# Patient Record
Sex: FEMALE | Race: WHITE | Employment: UNEMPLOYED | URBAN - METROPOLITAN AREA
[De-identification: names, ages, dates, MRNs, and addresses within clinical notes are randomized per-mention and may not be internally consistent; named-entity substitution may affect disease eponyms.]

---

## 2017-11-08 LAB — PAP-ABSTRACT: NORMAL

## 2018-03-13 ENCOUNTER — TRANSFERRED RECORDS (OUTPATIENT)
Dept: HEALTH INFORMATION MANAGEMENT | Facility: CLINIC | Age: 53
End: 2018-03-13

## 2019-03-01 ENCOUNTER — TRANSFERRED RECORDS (OUTPATIENT)
Dept: HEALTH INFORMATION MANAGEMENT | Facility: CLINIC | Age: 54
End: 2019-03-01

## 2019-03-01 LAB
ALT SERPL-CCNC: 37 IU/L (ref 8–45)
AST SERPL-CCNC: 30 IU/L (ref 2–40)
CHOLEST SERPL-MCNC: 203 MG/DL (ref 100–199)
CREAT SERPL-MCNC: 0.88 MG/DL (ref 0.57–1.11)
GFR SERPL CREATININE-BSD FRML MDRD: >60 ML/MIN/1.73M2
GLUCOSE SERPL-MCNC: 111 MG/DL (ref 65–100)
HDLC SERPL-MCNC: 52 MG/DL
LDLC SERPL CALC-MCNC: 104 MG/DL
NONHDLC SERPL-MCNC: 151 MG/DL
POTASSIUM SERPL-SCNC: 4 MMOL/L (ref 3.5–5)
TRIGL SERPL-MCNC: 233 MG/DL

## 2019-03-08 ENCOUNTER — OFFICE VISIT (OUTPATIENT)
Dept: FAMILY MEDICINE | Facility: CLINIC | Age: 54
End: 2019-03-08
Payer: COMMERCIAL

## 2019-03-08 VITALS
TEMPERATURE: 98 F | HEIGHT: 63 IN | BODY MASS INDEX: 36.94 KG/M2 | RESPIRATION RATE: 17 BRPM | HEART RATE: 86 BPM | DIASTOLIC BLOOD PRESSURE: 100 MMHG | OXYGEN SATURATION: 94 % | WEIGHT: 208.5 LBS | SYSTOLIC BLOOD PRESSURE: 132 MMHG

## 2019-03-08 DIAGNOSIS — Z12.31 ENCOUNTER FOR SCREENING MAMMOGRAM FOR BREAST CANCER: ICD-10-CM

## 2019-03-08 DIAGNOSIS — Z96.642 STATUS POST TOTAL REPLACEMENT OF LEFT HIP: ICD-10-CM

## 2019-03-08 DIAGNOSIS — Z72.0 TOBACCO USE: ICD-10-CM

## 2019-03-08 DIAGNOSIS — I10 ESSENTIAL HYPERTENSION: Primary | ICD-10-CM

## 2019-03-08 DIAGNOSIS — R09.81 NASAL CONGESTION: ICD-10-CM

## 2019-03-08 DIAGNOSIS — R73.9 ELEVATED BLOOD SUGAR: ICD-10-CM

## 2019-03-08 LAB — HBA1C MFR BLD: 5.8 % (ref 0–5.6)

## 2019-03-08 PROCEDURE — 36415 COLL VENOUS BLD VENIPUNCTURE: CPT | Performed by: PHYSICIAN ASSISTANT

## 2019-03-08 PROCEDURE — 99203 OFFICE O/P NEW LOW 30 MIN: CPT | Performed by: PHYSICIAN ASSISTANT

## 2019-03-08 PROCEDURE — 83036 HEMOGLOBIN GLYCOSYLATED A1C: CPT | Performed by: PHYSICIAN ASSISTANT

## 2019-03-08 RX ORDER — CHLORTHALIDONE 25 MG/1
25 TABLET ORAL DAILY
Qty: 90 TABLET | Refills: 0 | Status: SHIPPED | OUTPATIENT
Start: 2019-03-08 | End: 2019-06-11

## 2019-03-08 RX ORDER — FLUTICASONE PROPIONATE 50 MCG
2 SPRAY, SUSPENSION (ML) NASAL DAILY
Qty: 16 G | Refills: 0 | Status: SHIPPED | OUTPATIENT
Start: 2019-03-08 | End: 2019-06-20

## 2019-03-08 ASSESSMENT — MIFFLIN-ST. JEOR: SCORE: 1504.88

## 2019-03-08 NOTE — NURSING NOTE
Last mammogram done at [facility] in 3/2018. Hysterectomy in March of 2018, last pap done 9/2017 at this time at [facility] in [place]. Patient had colonoscopy done 7/2013 at [facility] in [place].

## 2019-03-08 NOTE — PATIENT INSTRUCTIONS
To schedule your mammogram at any of the [facility] locations, call [phone].    [facility] Consumer Price Line: [phone]    Try taking the losartan and chlorthalidone in the morning! These are your BP meds    Check the blood pressure next week at our pharmacy!

## 2019-03-11 PROBLEM — I10 HTN (HYPERTENSION): Status: ACTIVE | Noted: 2019-03-11

## 2019-03-19 ENCOUNTER — ALLIED HEALTH/NURSE VISIT (OUTPATIENT)
Dept: FAMILY MEDICINE | Facility: CLINIC | Age: 54
End: 2019-03-19
Payer: COMMERCIAL

## 2019-03-19 VITALS — SYSTOLIC BLOOD PRESSURE: 102 MMHG | DIASTOLIC BLOOD PRESSURE: 74 MMHG

## 2019-03-19 DIAGNOSIS — K21.00 GASTROESOPHAGEAL REFLUX DISEASE WITH ESOPHAGITIS: Primary | ICD-10-CM

## 2019-03-19 DIAGNOSIS — N95.1 MENOPAUSAL SYNDROME (HOT FLASHES): ICD-10-CM

## 2019-03-19 DIAGNOSIS — I10 HTN (HYPERTENSION): Primary | ICD-10-CM

## 2019-03-19 PROCEDURE — 99207 ZZC NO CHARGE NURSE ONLY: CPT | Performed by: PHYSICIAN ASSISTANT

## 2019-03-19 NOTE — PROGRESS NOTES
Phuong New is enrolled/participating in the retail pharmacy Blood Pressure Goals Achievement Program (BPGAP).  Phuong New was evaluated at [facility] on March 19, 2019 at which time her blood pressure was:    BP Readings from Last 3 Encounters:   03/19/19 102/74   03/08/19 (!) 132/100   08/12/16 117/73     Reviewed lifestyle modifications for blood pressure control and reduction: including making healthy food choices, managing weight, getting regular exercise, smoking cessation, reducing alcohol consumption, monitoring blood pressure regularly.     Phuong New is not experiencing symptoms.    Follow-Up: BP is at goal of < 140/90mmHg (patient 18+ years of age with or without diabetes).  Recommended follow-up at pharmacy in 6 months.     Recommendation to Provider: continue to monitor.  Patient was not experiencing any dizziness or light headedness.  Took blood pressure twice got same reading       This note completed by: Mony Valenzuela, PharmD  [facility]

## 2019-03-20 NOTE — TELEPHONE ENCOUNTER
LMTCB-    Patient asked to have Pantoprazole(protonix) reordered do not see that medication on list.     Is she wanting Omeprazole (prilosec) reordered? This is on list.     Livia Osborn RN Flex

## 2019-03-20 NOTE — TELEPHONE ENCOUNTER
It was Omeprazole (prilosec) that patient wants reordered.     LMTCB- need why she was taking the prilosec and gabapentin.     Livia Osborn RN Flex

## 2019-03-22 RX ORDER — GABAPENTIN 600 MG/1
600 TABLET ORAL DAILY
Qty: 90 TABLET | Refills: 0 | Status: SHIPPED | OUTPATIENT
Start: 2019-03-22 | End: 2019-06-28

## 2019-03-22 NOTE — TELEPHONE ENCOUNTER
Routing to Giovani to advise on refills for Omeprazole 20 mg every day prn for GERD & Gabapentin 600 mg daily for hot flashes    [place] Gabap[place] last filled: 1/2/19 #90, 9/19/18 #90

## 2019-03-22 NOTE — TELEPHONE ENCOUNTER
Patient called back she takes prilosec for acid reflux and the gabaoentin for hot flashes   Patient stated that the   Gabapentin is 600 mg incase pcp needs to know

## 2019-04-13 ENCOUNTER — ANCILLARY PROCEDURE (OUTPATIENT)
Dept: MAMMOGRAPHY | Facility: CLINIC | Age: 54
End: 2019-04-13
Attending: PHYSICIAN ASSISTANT
Payer: COMMERCIAL

## 2019-04-13 DIAGNOSIS — Z12.31 ENCOUNTER FOR SCREENING MAMMOGRAM FOR BREAST CANCER: ICD-10-CM

## 2019-04-13 PROCEDURE — 77067 SCR MAMMO BI INCL CAD: CPT

## 2019-04-13 PROCEDURE — 77063 BREAST TOMOSYNTHESIS BI: CPT

## 2019-06-07 DIAGNOSIS — I10 ESSENTIAL HYPERTENSION: Primary | ICD-10-CM

## 2019-06-07 DIAGNOSIS — K21.00 GASTROESOPHAGEAL REFLUX DISEASE WITH ESOPHAGITIS: ICD-10-CM

## 2019-06-07 NOTE — TELEPHONE ENCOUNTER
"Requested Prescriptions   Pending Prescriptions Disp Refills     omeprazole (PRILOSEC) 20 MG DR capsule [Pharmacy Med Name: OMEPRAZOLE 20MG CPDR]  Last Written Prescription Date:  3/22/19  Last Fill Quantity: 90,  # refills: 0    Last office visit: 3/8/2019 with prescribing provider:  Giovani Vang PA-C        Future Office Visit:     90 capsule 0     Sig: TAKE ONE CAPSULE BY MOUTH ONCE DAILY       PPI Protocol Passed - 6/7/2019  2:25 PM        Passed - Not on Clopidogrel (unless Pantoprazole ordered)        Passed - No diagnosis of osteoporosis on record        Passed - Recent (12 mo) or future (30 days) visit within the authorizing provider's specialty     Patient had office visit in the last 12 months or has a visit in the next 30 days with authorizing provider or within the authorizing provider's specialty.  See \"Patient Info\" tab in inbasket, or \"Choose Columns\" in Meds & Orders section of the refill encounter.              Passed - Medication is active on med list        Passed - Patient is age 18 or older        Passed - No active pregnacy on record        Passed - No positive pregnancy test in past 12 months          "

## 2019-06-07 NOTE — TELEPHONE ENCOUNTER
"Requested Prescriptions   Pending Prescriptions Disp Refills     chlorthalidone (HYGROTON) 25 MG tablet  Last Written Prescription Date:  3/8/19  Last Fill Quantity: 90,  # refills: 0    Last office visit: 3/8/2019 with prescribing provider:  Giovani Vang PA-C       Future Office Visit:     90 tablet 0     Sig: Take 1 tablet (25 mg) by mouth daily       Diuretics (Including Combos) Protocol Failed - 6/7/2019  2:31 PM        Failed - Normal serum creatinine on file in past 12 months     Recent Labs   Lab Test 08/10/16  0540   CR 0.61              Failed - Normal serum potassium on file in past 12 months     No lab results found.                 Failed - Normal serum sodium on file in past 12 months     No lab results found.           Passed - Blood pressure under 140/90 in past 12 months     BP Readings from Last 3 Encounters:   03/19/19 102/74   03/08/19 (!) 132/100   08/12/16 117/73                 Passed - Recent (12 mo) or future (30 days) visit within the authorizing provider's specialty     Patient had office visit in the last 12 months or has a visit in the next 30 days with authorizing provider or within the authorizing provider's specialty.  See \"Patient Info\" tab in inbasket, or \"Choose Columns\" in Meds & Orders section of the refill encounter.              Passed - Medication is active on med list        Passed - Patient is age 18 or older        Passed - No active pregancy on record        Passed - No positive pregnancy test in past 12 months        losartan (COZAAR) 100 MG tablet  Last Written Prescription Date:  historical  Last Fill Quantity: na,  # refills: na    Last office visit: 3/8/2019 with prescribing provider:  Giovani Vang PA-C       Future Office Visit:           Sig: Take 1 tablet (100 mg) by mouth daily       Angiotensin-II Receptors Failed - 6/7/2019  2:31 PM        Failed - Normal serum creatinine on file in past 12 months     Recent Labs   Lab Test 08/10/16  0540   CR " "0.61             Failed - Normal serum potassium on file in past 12 months     No lab results found.                 Passed - Blood pressure under 140/90 in past 12 months     BP Readings from Last 3 Encounters:   03/19/19 102/74   03/08/19 (!) 132/100   08/12/16 117/73                 Passed - Recent (12 mo) or future (30 days) visit within the authorizing provider's specialty     Patient had office visit in the last 12 months or has a visit in the next 30 days with authorizing provider or within the authorizing provider's specialty.  See \"Patient Info\" tab in inbasket, or \"Choose Columns\" in Meds & Orders section of the refill encounter.              Passed - Medication is active on med list        Passed - Patient is age 18 or older        Passed - No active pregnancy on record        Passed - No positive pregnancy test in past 12 months          "

## 2019-06-11 RX ORDER — CHLORTHALIDONE 25 MG/1
25 TABLET ORAL DAILY
Qty: 30 TABLET | Refills: 0 | Status: SHIPPED | OUTPATIENT
Start: 2019-06-11 | End: 2019-06-20

## 2019-06-11 RX ORDER — LOSARTAN POTASSIUM 100 MG/1
100 TABLET ORAL DAILY
Qty: 30 TABLET | Refills: 0 | Status: SHIPPED | OUTPATIENT
Start: 2019-06-11 | End: 2019-06-20

## 2019-06-11 NOTE — TELEPHONE ENCOUNTER
Medication is being filled for 1 time refill only due to:  Patient needs to be seen because due for a med check.     Will forward to the station, please try and help the pt schedule an appt for a med check.  Thanks!

## 2019-06-11 NOTE — TELEPHONE ENCOUNTER
Medication is being filled for 1 time refill only due to:  Pt due for appt. Scheduled 6/20/19- will be out of meds prior to appt.     Anusha w/ DM- ok to refill for 30 days even if not prescribed mentioned in last note and is scheduled to see him.     Soheila KATHLEEN RN

## 2019-06-11 NOTE — TELEPHONE ENCOUNTER
Pt called back and I scheduled her for 6/20 with PCP.  I told her the [facility] was approved for one fill and she said there were two other medications she had asked for and wanted to say she would be out of them before the appt on 6/20.  I only saw a reorder for chlorthalidone (HYGROTON)  And LOSARTAN POTASSIUM PO  With a request date from 6/7 but it looks like they are still pending.      Please call pt back to confirm if her other refills will be approved for  before scheduled appt    [phone]

## 2019-06-11 NOTE — TELEPHONE ENCOUNTER
ALEKS- she needs to be scheduled for appt tom/ Estiven Vang for med check- she has pending RX refills.     Soheila KATHLEEN RN

## 2019-06-18 NOTE — PROGRESS NOTES
Subjective     Phuong New is a 54 year old female who presents to clinic today for the following health issues:    HPI   Hypertension Follow-up      Do you check your blood pressure regularly outside of the clinic? No     Are you following a low salt diet? No    Are your blood pressures ever more than 140 on the top number (systolic) OR more   than 90 on the bottom number (diastolic), for example 140/90? Unsure     Amount of exercise or physical activity: None    Problems taking medications regularly: No    Medication side effects: none    Diet: regular (no restrictions)    -Phuong presents for a BP check  -at her last visit she was uncontrolled but had not been on her meds that day  -she tolerates them well   -first on losartan, then chlorthalidone  -nocturia has now improved by moving the diuretic to the morning  -does note lower energy the past few weeks  -has additionally noted some left sided back pain, can wrap around to front  -did move just prior to the onset of back symptoms   -unsure about hematuria  -BMs have been off; slightly different  -O2 slightly on the low end; normal; denies any shortness of breath         Medication Followup of Omeprazole     Taking Medication as prescribed: yes    Side Effects:  None    Medication Helping Symptoms:  Yes     Taking PPI daily for acid reflux  Controlled      Patient Active Problem List   Diagnosis     S/P total hip arthroplasty     HTN (hypertension)     Past Surgical History:   Procedure Laterality Date     ARTHROPLASTY HIP Left 8/9/2016    Procedure: ARTHROPLASTY HIP;  Surgeon: Janes Montoya MD;  Location: [facility] OR     HYSTERECTOMY, PAP NO LONGER INDICATED      robotic total laparoscopic hysterectomy, bilateral salpingectomy, right oophorectomy, cystoscopy 03/19/2018      TUBAL LIGATION         Social History     Tobacco Use     Smoking status: Current Every Day Smoker     Packs/day: 1.00     Years: 40.00     Pack years: 40.00     Types: Cigarettes      "Smokeless tobacco: Never Used   Substance Use Topics     Alcohol use: Yes     Comment: 2 beers daily 3-4 times weekly     History reviewed. No pertinent family history.      Reviewed and updated as needed this visit by Provider         Review of Systems   See Above      Objective    /74   Pulse 79   Temp 97.9  F (36.6  C) (Tympanic)   Resp 18   Ht 1.6 m (5' 3\")   Wt 93.7 kg (206 lb 9.6 oz)   SpO2 92%   BMI 36.60 kg/m    Body mass index is 36.6 kg/m .  Physical Exam   GENERAL: healthy, alert and no distress  EYES: Eyes grossly normal to inspection, PERRL and conjunctivae and sclerae normal  HENT: ear canals and TM's normal, nose and mouth without ulcers or lesions  NECK: no jvd  RESP: wheezing to LLL; no rales  CV: regular rates and rhythm  MS: No peripheral edema   SKIN: no suspicious lesions or rashes  PSYCH: mentation appears normal, affect normal/bright    Diagnostic Test Results:  Results for orders placed or performed in visit on 06/20/19 (from the past 24 hour(s))   CBC with platelets and differential   Result Value Ref [facility]    WBC 6.8 4.0 - 11.0 10e9/L    RBC Count 5.31 (H) 3.8 - 5.2 10e12/L    Hemoglobin 17.1 (H) 11.7 - 15.7 g/dL    Hematocrit 50.0 (H) 35.0 - 47.0 %    MCV 94 78 - 100 fl    MCH 32.2 26.5 - 33.0 pg    MCHC 34.2 31.5 - 36.5 g/dL    RDW 13.3 10.0 - 15.0 %    Platelet Count 224 150 - 450 10e9/L    % Neutrophils 59.1 %    % Lymphocytes 28.6 %    % Monocytes 9.2 %    % Eosinophils 2.7 %    % Basophils 0.4 %    Absolute Neutrophil 4.0 1.6 - 8.3 10e9/L    Absolute Lymphocytes 1.9 0.8 - 5.3 10e9/L    Absolute Monocytes 0.6 0.0 - 1.3 10e9/L    Absolute Eosinophils 0.2 0.0 - 0.7 10e9/L    Absolute Basophils 0.0 0.0 - 0.2 10e9/L    Diff Method Automated Method    *UA reflex to Microscopic and Culture ([facility] (except [place] and [place])   Result Value Ref [facility]    Color Urine Yellow     Appearance Urine Clear     Glucose Urine Negative NEG^Negative mg/dL    " Bilirubin Urine Negative NEG^Negative    Ketones Urine Negative NEG^Negative mg/dL    Specific Gravity Urine 1.020 1.003 - 1.035    Blood Urine Negative NEG^Negative    pH Urine 7.0 5.0 - 7.0 pH    Protein Albumin Urine Negative NEG^Negative mg/dL    Urobilinogen Urine 0.2 0.2 - 1.0 EU/dL    Nitrite Urine Negative NEG^Negative    Leukocyte Esterase Urine Negative NEG^Negative    Source Midstream Urine      CXR - unremarkable        Assessment & Plan     1. Essential hypertension  This is well controlled. To consider halving the diuretic dose to 12.5mg daily. Continue present management for now. Continue to work at lifestyle changes  - chlorthalidone (HYGROTON) 25 MG tablet; Take 1 tablet (25 mg) by mouth daily  Dispense: 90 tablet; Refill: 1  - losartan (COZAAR) 100 MG tablet; Take 1 tablet (100 mg) by mouth daily  Dispense: 90 tablet; Refill: 1    2. Other fatigue  3. Elevated hemoglobin (H)  4. Tobacco use  5. Wheeze  Unclear if these symptoms are intertwined or separate. Her sats were initially low (and trend that way) but did come back to 95 in clinic. Xray appeared without acute infectious concern. Wheeze on exam and chronic tobacco use elicit concern for copd though she notes she was tested a year or two ago on spirometry and showed no such dx. Still, short course of steroid to help wheeze; suspect symptoms will improve. The elevated hgb and hematocrit, I suspect is likely due to chronic smoking/lung disease but will run a smear to r/o PV. Follow-up per symptoms and/or results  - XR Chest 2 Views; Future  - CBC with platelets and differential  - *UA reflex to Microscopic and Culture ([facility] (except [place] and [place])  - Blood Morphology Pathologist Review  - Reticulocyte Count  - predniSONE (DELTASONE) 20 MG tablet; Take 2 tablets (40 mg) by mouth daily  Dispense: 10 tablet; Refill: 0    6. Gastroesophageal reflux disease with esophagitis  Continue present management.     7. Morbid  "obesity (H)  Continue to work at goals    8. Flank pain  Likely muscular from move  - CBC with platelets and differential  - *UA reflex to Microscopic and Culture ([facility] (except [place] and [place])    9. Nasal congestion  Continue present management.   - fluticasone (FLONASE) 50 MCG/ACT nasal spray; Spray 2 sprays into both nostrils daily  Dispense: 16 g; Refill: 0     Tobacco Cessation:   reports that she has been smoking cigarettes.  She has a 40.00 pack-year smoking history. She has never used smokeless tobacco.  Tobacco Cessation Action Plan: Information offered: Patient not interested at this time      BMI:   Estimated body mass index is 36.6 kg/m  as calculated from the following:    Height as of this encounter: 1.6 m (5' 3\").    Weight as of this encounter: 93.7 kg (206 lb 9.6 oz).   Weight management plan: Discussed healthy diet and exercise guidelines      Return in about 6 months (around 12/20/2019) for BP Recheck.    Giovani Vang PA-C  [facility]      "

## 2019-06-20 ENCOUNTER — OFFICE VISIT (OUTPATIENT)
Dept: FAMILY MEDICINE | Facility: CLINIC | Age: 54
End: 2019-06-20
Payer: COMMERCIAL

## 2019-06-20 ENCOUNTER — ANCILLARY PROCEDURE (OUTPATIENT)
Dept: GENERAL RADIOLOGY | Facility: CLINIC | Age: 54
End: 2019-06-20
Attending: PHYSICIAN ASSISTANT
Payer: COMMERCIAL

## 2019-06-20 VITALS
HEIGHT: 63 IN | DIASTOLIC BLOOD PRESSURE: 74 MMHG | HEART RATE: 79 BPM | RESPIRATION RATE: 18 BRPM | TEMPERATURE: 97.9 F | SYSTOLIC BLOOD PRESSURE: 108 MMHG | WEIGHT: 206.6 LBS | OXYGEN SATURATION: 92 % | BODY MASS INDEX: 36.61 KG/M2

## 2019-06-20 DIAGNOSIS — D58.2 ELEVATED HEMOGLOBIN (H): ICD-10-CM

## 2019-06-20 DIAGNOSIS — Z72.0 TOBACCO USE: ICD-10-CM

## 2019-06-20 DIAGNOSIS — K21.00 GASTROESOPHAGEAL REFLUX DISEASE WITH ESOPHAGITIS: ICD-10-CM

## 2019-06-20 DIAGNOSIS — R53.83 OTHER FATIGUE: ICD-10-CM

## 2019-06-20 DIAGNOSIS — R09.81 NASAL CONGESTION: ICD-10-CM

## 2019-06-20 DIAGNOSIS — R10.9 FLANK PAIN: ICD-10-CM

## 2019-06-20 DIAGNOSIS — I10 ESSENTIAL HYPERTENSION: Primary | ICD-10-CM

## 2019-06-20 DIAGNOSIS — R06.2 WHEEZE: ICD-10-CM

## 2019-06-20 DIAGNOSIS — E66.01 MORBID OBESITY (H): ICD-10-CM

## 2019-06-20 LAB
ALBUMIN UR-MCNC: NEGATIVE MG/DL
APPEARANCE UR: CLEAR
BASOPHILS # BLD AUTO: 0 10E9/L (ref 0–0.2)
BASOPHILS NFR BLD AUTO: 0.4 %
BILIRUB UR QL STRIP: NEGATIVE
COLOR UR AUTO: YELLOW
DIFFERENTIAL METHOD BLD: ABNORMAL
EOSINOPHIL # BLD AUTO: 0.2 10E9/L (ref 0–0.7)
EOSINOPHIL NFR BLD AUTO: 2.7 %
ERYTHROCYTE [DISTWIDTH] IN BLOOD BY AUTOMATED COUNT: 13.3 % (ref 10–15)
GLUCOSE UR STRIP-MCNC: NEGATIVE MG/DL
HCT VFR BLD AUTO: 50 % (ref 35–47)
HGB BLD-MCNC: 17.1 G/DL (ref 11.7–15.7)
HGB UR QL STRIP: NEGATIVE
KETONES UR STRIP-MCNC: NEGATIVE MG/DL
LEUKOCYTE ESTERASE UR QL STRIP: NEGATIVE
LYMPHOCYTES # BLD AUTO: 1.9 10E9/L (ref 0.8–5.3)
LYMPHOCYTES NFR BLD AUTO: 28.6 %
MCH RBC QN AUTO: 32.2 PG (ref 26.5–33)
MCHC RBC AUTO-ENTMCNC: 34.2 G/DL (ref 31.5–36.5)
MCV RBC AUTO: 94 FL (ref 78–100)
MONOCYTES # BLD AUTO: 0.6 10E9/L (ref 0–1.3)
MONOCYTES NFR BLD AUTO: 9.2 %
NEUTROPHILS # BLD AUTO: 4 10E9/L (ref 1.6–8.3)
NEUTROPHILS NFR BLD AUTO: 59.1 %
NITRATE UR QL: NEGATIVE
PH UR STRIP: 7 PH (ref 5–7)
PLATELET # BLD AUTO: 224 10E9/L (ref 150–450)
PLATELET # BLD EST: ABNORMAL 10*3/UL
RBC # BLD AUTO: 5.31 10E12/L (ref 3.8–5.2)
RBC MORPH BLD: ABNORMAL
RETICS # AUTO: 112.1 10E9/L (ref 25–95)
RETICS/RBC NFR AUTO: 2.1 % (ref 0.5–2)
SOURCE: NORMAL
SP GR UR STRIP: 1.02 (ref 1–1.03)
UROBILINOGEN UR STRIP-ACNC: 0.2 EU/DL (ref 0.2–1)
WBC # BLD AUTO: 6.8 10E9/L (ref 4–11)

## 2019-06-20 PROCEDURE — 85025 COMPLETE CBC W/AUTO DIFF WBC: CPT | Performed by: PHYSICIAN ASSISTANT

## 2019-06-20 PROCEDURE — 85045 AUTOMATED RETICULOCYTE COUNT: CPT | Performed by: PHYSICIAN ASSISTANT

## 2019-06-20 PROCEDURE — 81003 URINALYSIS AUTO W/O SCOPE: CPT | Performed by: PHYSICIAN ASSISTANT

## 2019-06-20 PROCEDURE — 99214 OFFICE O/P EST MOD 30 MIN: CPT | Performed by: PHYSICIAN ASSISTANT

## 2019-06-20 PROCEDURE — 71046 X-RAY EXAM CHEST 2 VIEWS: CPT | Mod: FY

## 2019-06-20 PROCEDURE — 85060 BLOOD SMEAR INTERPRETATION: CPT | Performed by: PHYSICIAN ASSISTANT

## 2019-06-20 PROCEDURE — 36415 COLL VENOUS BLD VENIPUNCTURE: CPT | Performed by: PHYSICIAN ASSISTANT

## 2019-06-20 RX ORDER — CHLORTHALIDONE 25 MG/1
25 TABLET ORAL DAILY
Qty: 90 TABLET | Refills: 1 | Status: SHIPPED | OUTPATIENT
Start: 2019-06-20 | End: 2019-07-22

## 2019-06-20 RX ORDER — FLUTICASONE PROPIONATE 50 MCG
2 SPRAY, SUSPENSION (ML) NASAL DAILY
Qty: 16 G | Refills: 0 | Status: SHIPPED | OUTPATIENT
Start: 2019-06-20 | End: 2019-07-22

## 2019-06-20 RX ORDER — PREDNISONE 20 MG/1
40 TABLET ORAL DAILY
Qty: 10 TABLET | Refills: 0 | Status: SHIPPED | OUTPATIENT
Start: 2019-06-20 | End: 2019-11-05

## 2019-06-20 RX ORDER — LOSARTAN POTASSIUM 100 MG/1
100 TABLET ORAL DAILY
Qty: 90 TABLET | Refills: 1 | Status: SHIPPED | OUTPATIENT
Start: 2019-06-20 | End: 2019-07-22

## 2019-06-20 ASSESSMENT — MIFFLIN-ST. JEOR: SCORE: 1496.26

## 2019-06-21 LAB — COPATH REPORT: NORMAL

## 2019-06-25 DIAGNOSIS — D58.2 ELEVATED HEMOGLOBIN (H): Primary | ICD-10-CM

## 2019-06-25 DIAGNOSIS — Z72.0 TOBACCO USE: ICD-10-CM

## 2019-06-25 NOTE — PROGRESS NOTES
I placed the order for a one time consult with hematology. Please share with her the phone number to schedule: [facility] [place] [phone]

## 2019-06-26 NOTE — TELEPHONE ENCOUNTER
RECORDS STATUS - ALL OTHER DIAGNOSIS      RECORDS RECEIVED FROM: [facility]   DATE RECEIVED: 6/26/19   NOTES STATUS DETAILS   OFFICE NOTE from referring provider Giovani Martin PA-C   OFFICE NOTE from medical oncologist     DISCHARGE SUMMARY from hospital     DISCHARGE REPORT from the ER     OPERATIVE REPORT     MEDICATION LIST     CLINICAL TRIAL TREATMENTS TO DATE     LABS     PATHOLOGY REPORTS     ANYTHING RELATED TO DIAGNOSIS Epic 6/20/19   GENONOMIC TESTING     TYPE:     IMAGING (NEED IMAGES & REPORT)     CT SCANS     MRI     MAMMO     ULTRASOUND     PET

## 2019-06-26 NOTE — TELEPHONE ENCOUNTER
ONCOLOGY INTAKE: Records Information      APPT INFORMATION:  Referring provider:  Giovani Vang PA-C  Referring provider s clinic:  [facility]  Reason for visit/diagnosis:  Elevated Hemoglobin  Has patient been notified of appointment date and time?: Per PT    RECORDS INFORMATION:  Were the records received with the referral (via [employer])? No - Internal Referral    Has patient been seen for any external appt for this diagnosis? Per PT, no outside records    ADDITIONAL INFORMATION:  NA

## 2019-06-27 ENCOUNTER — TELEPHONE (OUTPATIENT)
Dept: FAMILY MEDICINE | Facility: CLINIC | Age: 54
End: 2019-06-27

## 2019-06-27 NOTE — TELEPHONE ENCOUNTER
Reason for call:  Form   Our goal is to have forms completed within 72 hours, however some forms may require a visit or additional information.     Who is the form from? Patient  Where did the form come from? Patient or family brought in     What [facility]  Where was the form placed? [facility] Box/Folder  What number is listed as a contact on the form? [phone]    Phone call message - patient request for a letter, form or note:     Date needed: as soon as possible  Patient will  at the clinic when completed  Has the patient signed a consent form for release of information? YES    Additional comments: NEEDS ASAP    Type of letter, form or note: disability    Phone number to reach patient:  Home number on file [phone] (home)    Best Time:  ANY    Can we leave a detailed message on this number?  YES

## 2019-06-28 DIAGNOSIS — N95.1 MENOPAUSAL SYNDROME (HOT FLASHES): ICD-10-CM

## 2019-06-28 RX ORDER — GABAPENTIN 600 MG/1
TABLET ORAL
Qty: 90 TABLET | Refills: 0 | Status: SHIPPED | OUTPATIENT
Start: 2019-06-28 | End: 2019-11-08

## 2019-06-28 NOTE — TELEPHONE ENCOUNTER
Requested Prescriptions   Pending Prescriptions Disp Refills     gabapentin (NEURONTIN) 600 MG tablet [Pharmacy Med Name: GABAPENTIN 600MG TABLETS] 90 tablet 0     Sig: TAKE ONE TABLET BY MOUTH DAILY       There is no refill protocol information for this order        Last Written Prescription Date:  3/22/19  Last Fill Quantity: 90,  # refills: 0   Last office visit: 6/20/2019 with prescribing provider:  Giovani Vang PA-C   Future Office Visit:

## 2019-07-22 ENCOUNTER — TELEPHONE (OUTPATIENT)
Dept: FAMILY MEDICINE | Facility: CLINIC | Age: 54
End: 2019-07-22

## 2019-07-22 DIAGNOSIS — I10 ESSENTIAL HYPERTENSION: ICD-10-CM

## 2019-07-22 DIAGNOSIS — R09.81 NASAL CONGESTION: ICD-10-CM

## 2019-07-22 DIAGNOSIS — K21.00 GASTROESOPHAGEAL REFLUX DISEASE WITH ESOPHAGITIS: ICD-10-CM

## 2019-07-22 RX ORDER — FLUTICASONE PROPIONATE 50 MCG
2 SPRAY, SUSPENSION (ML) NASAL DAILY
Qty: 16 G | Refills: 0 | Status: SHIPPED | OUTPATIENT
Start: 2019-07-22 | End: 2020-07-20

## 2019-07-22 RX ORDER — CHLORTHALIDONE 25 MG/1
25 TABLET ORAL DAILY
Qty: 90 TABLET | Refills: 1 | Status: SHIPPED | OUTPATIENT
Start: 2019-07-22 | End: 2020-03-06

## 2019-07-22 RX ORDER — LOSARTAN POTASSIUM 100 MG/1
100 TABLET ORAL DAILY
Qty: 90 TABLET | Refills: 1 | Status: SHIPPED | OUTPATIENT
Start: 2019-07-22 | End: 2019-09-26

## 2019-07-22 NOTE — TELEPHONE ENCOUNTER
Reason for Call:  Medication or medication refill:    Do you use a [facility]?  Name of the pharmacy and phone number for the current request:  [facility] [address] [place] [place], [place]    Name of the medication requested: losartan (COZAAR) 100 MG tablet,   omeprazole (PRILOSEC) 20 MG DR capsule  Other request:   Patient is requesting a 90 day RX  Can we leave a detailed message on this number? YES    Phone number patient can be reached at: Home number on file [phone] (home)    Best Time: any     Call taken on 7/22/2019 at 10:41 AM by Minerva Espino

## 2019-07-22 NOTE — TELEPHONE ENCOUNTER
[facility] and patient called. Patient did not  her medications from [facility] after 6/20/19. She would now like them sent to the [facility] in [place].     Done at this time.     Livia Osborn RN Flex

## 2019-08-06 ENCOUNTER — ONCOLOGY VISIT (OUTPATIENT)
Dept: ONCOLOGY | Facility: CLINIC | Age: 54
End: 2019-08-06
Attending: PHYSICIAN ASSISTANT
Payer: COMMERCIAL

## 2019-08-06 ENCOUNTER — PRE VISIT (OUTPATIENT)
Dept: ONCOLOGY | Facility: CLINIC | Age: 54
End: 2019-08-06

## 2019-08-06 ENCOUNTER — HOSPITAL ENCOUNTER (OUTPATIENT)
Facility: CLINIC | Age: 54
Setting detail: SPECIMEN
Discharge: HOME | End: 2019-08-06
Attending: PHYSICIAN ASSISTANT | Admitting: INTERNAL MEDICINE
Payer: COMMERCIAL

## 2019-08-06 VITALS
HEART RATE: 90 BPM | HEIGHT: 63 IN | DIASTOLIC BLOOD PRESSURE: 79 MMHG | WEIGHT: 207.8 LBS | SYSTOLIC BLOOD PRESSURE: 110 MMHG | TEMPERATURE: 98.1 F | BODY MASS INDEX: 36.82 KG/M2 | OXYGEN SATURATION: 91 % | RESPIRATION RATE: 16 BRPM

## 2019-08-06 DIAGNOSIS — E83.110 HEREDITARY HEMOCHROMATOSIS (H): ICD-10-CM

## 2019-08-06 DIAGNOSIS — R71.8 ELEVATED HEMATOCRIT: Primary | ICD-10-CM

## 2019-08-06 LAB
FERRITIN SERPL-MCNC: 88 NG/ML (ref 8–252)
IRON SATN MFR SERPL: 31 % (ref 15–46)
IRON SERPL-MCNC: 117 UG/DL (ref 35–180)
LDH SERPL L TO P-CCNC: 189 U/L (ref 81–234)
TIBC SERPL-MCNC: 380 UG/DL (ref 240–430)

## 2019-08-06 PROCEDURE — 83615 LACTATE (LD) (LDH) ENZYME: CPT | Performed by: INTERNAL MEDICINE

## 2019-08-06 PROCEDURE — 83540 ASSAY OF IRON: CPT | Performed by: INTERNAL MEDICINE

## 2019-08-06 PROCEDURE — 99205 OFFICE O/P NEW HI 60 MIN: CPT | Performed by: INTERNAL MEDICINE

## 2019-08-06 PROCEDURE — 83550 IRON BINDING TEST: CPT | Performed by: INTERNAL MEDICINE

## 2019-08-06 PROCEDURE — 82728 ASSAY OF FERRITIN: CPT | Performed by: INTERNAL MEDICINE

## 2019-08-06 PROCEDURE — 82668 ASSAY OF ERYTHROPOIETIN: CPT | Performed by: INTERNAL MEDICINE

## 2019-08-06 PROCEDURE — G0463 HOSPITAL OUTPT CLINIC VISIT: HCPCS

## 2019-08-06 ASSESSMENT — PAIN SCALES - GENERAL: PAINLEVEL: NO PAIN (0)

## 2019-08-06 ASSESSMENT — MIFFLIN-ST. JEOR: SCORE: 1501.7

## 2019-08-06 NOTE — LETTER
8/6/2019         RE: Phuong New  [address] [facility] [address]        Dear Colleague,    Thank you for referring your patient, Phuong New, to the [facility] CANCER CARE. Please see a copy of my visit note below.    [facility]    NEW PATIENT VISIT NOTE    PATIENT NAME: Phuong New MRN # [id number]  DATE OF VISIT: August 6, 2019 YOB: 1965    REFERRING PROVIDER: Giovani Vang PA-C  [address] [place] [address]        HISTORY OF PRESENT ILLNESS   Phuong New is 54 year old female who has been referred with polycythemia.     She had been to her PCP for back pain and had routine blood evaluation which has revealed elevated red cell counts for which she has been referred to hematology. Her sister had breast cancer and leukemia. Her 3 sisters and her mother had breast cancer.     She feels achy in her abdomen. She does not feel regular. She also notes of lipoma in her left side of abdomen. She had been taking fiber and has stopped it and switched to probiotics. She feels that she has stomach problems all the time. Some days are worse for her than others.     She continues to have back pain. She has 2 bulging discs in her back. She had hip replacement in her left hip. She gets back pain every time she raises her arms. She cannot walk far due to back pain as she starts teetering.     She denies erythromelalgia. She does however note that she itches all the time. She uses lotions. She had hysterectomy in March 2018 for a fibrous tumor involving wall of uterus.     She has not been tested for obstructive sleep apnea but there have been some concerns for this. A sleep study was recommended during her pre-op physical prior to hip replacement but she needed cardiac stress test and coronary angiogram and could not afford the sutdy.     She gets headaches a lot. She also gets muscle aches -  Leg/arm/back muscles. She  suspects she has arthritis involving her hands and feet.      PAST MEDICAL HISTORY     Past Medical History:   Diagnosis Date     Arthritis      Hot flashes      Hypercholesteraemia      Hypertension     No cariologist     Obese      Other chronic pain      Sleep apnea     Possible slep apnea          CURRENT OUTPATIENT MEDICATIONS     Current Outpatient Medications   Medication Sig     albuterol (2.5 MG/3ML) 0.083% nebulizer solution Take 1 vial (2.5 mg) by nebulization every 6 hours as needed for shortness of breath / dyspnea or wheezing     albuterol (ALBUTEROL) 108 (90 BASE) MCG/ACT inhaler Inhale 2 puffs into the lungs every 4 hours as needed for shortness of breath / dyspnea     aspirin (ASA) 81 MG EC tablet Take 1 tablet (81 mg) by mouth daily     ATORVASTATIN CALCIUM PO Take 40 mg by mouth daily     chlorthalidone (HYGROTON) 25 MG tablet Take 1 tablet (25 mg) by mouth daily     fluticasone (FLONASE) 50 MCG/ACT nasal spray Spray 2 sprays into both nostrils daily     gabapentin (NEURONTIN) 600 MG tablet TAKE ONE TABLET BY MOUTH DAILY     loratadine (CLARITIN) 10 MG tablet Take 10 mg by mouth daily     losartan (COZAAR) 100 MG tablet Take 1 tablet (100 mg) by mouth daily     omeprazole (PRILOSEC) 20 MG DR capsule TAKE ONE CAPSULE BY MOUTH ONCE DAILY     predniSONE (DELTASONE) 20 MG tablet Take 2 tablets (40 mg) by mouth daily     No current facility-administered medications for this visit.         ALLERGIES      Allergies   Allergen Reactions     Lisinopril Cough        SOCIAL HISTORY   She is  and lives with her . She has 2 grown up kids.     She is currently on social security disability. 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 She has been smoking since she was 14. Both her parents were smoker. She drinks daily - couple of drinks before dinner. She denies drug abuse.      FAMILY  "HISTORY   - She has 4 sisters and 3 of them had breast cancer; 4th sister had esophageal cancer  - Father  at 43 of lung cancer  - Mother  at 82 yrs of age;  of leukemia but CABG x 2, lung cancer  - She has 2 brothers who are healthy     REVIEW OF SYSTEMS   As above in the HPI, o/w complete 12-point ROS was negative.     PHYSICAL EXAM   /79   Pulse 90   Temp 98.1  F (36.7  C) (Tympanic)   Resp 16   Ht 1.6 m (5' 3\")   Wt 94.3 kg (207 lb 12.8 oz)   SpO2 91%   BMI 36.81 kg/m      @LASTSAO2(4)@  Wt Readings from Last 3 Encounters:   19 94.3 kg (207 lb 12.8 oz)   19 93.7 kg (206 lb 9.6 oz)   19 94.6 kg (208 lb 8 oz)     GEN: NAD  HEENT: PERRL, EOMI, no icterus, injection or pallor. Oropharynx is clear.  NECK: no cervical or supraclavicular lymphadenopathy  LUNGS: clear bilaterally  CV: regular, no murmurs, rubs, or gallops  ABDOMEN: soft, non-tender, non-distended, normal bowel sounds, no hepatosplenomegaly by percussion or palpation  EXT: warm, well perfused, no edema  NEURO: alert  SKIN: no rashes     LABORATORY AND IMAGING STUDIES     Recent Labs   Lab Test 19  0626 08/10/16  0540   POTASSIUM 4.0  --   --    CR 0.88  --  0.61   * 87 106*     No results for input(s): MAG, PHOS in the last 84480 hours.  Recent Labs   Lab Test 19  1331 16  0626 08/10/16  0540   WBC 6.8  --   --    HGB 17.1* 13.0 13.3     --   --    MCV 94  --   --    NEUTROPHIL 59.1  --   --      Recent Labs   Lab Test 19   ALT 37   AST 30     No results found for: TSH  No results for input(s): CEA in the last 85600 hours.  Results for orders placed or performed in visit on 19   XR Chest 2 Views    Narrative    XR CHEST 2 VW 2019 1:52 PM    HISTORY: Fatigue. Tobacco use. Cough.    COMPARISON: 2015.      Impression    IMPRESSION: 2 views of the chest show no acute cardiopulmonary disease  and no significant change.     ROS CRUZ MD       Lab Results "   Component Value Date    PATH  06/20/2019     Patient Name: CRYS LOBO  MR#: [id number]  Specimen #: [id number]  Collected: 6/20/2019  Received: 6/21/2019  Reported: 6/21/2019 08:33  Ordering Phy(s): MARIAM CAICEDO    For improved result formatting, select 'View Enhanced Report Format' under   Linked Documents section.    TEST(S):  Peripheral Smear Morphology    FINAL DIAGNOSIS:  Peripheral blood morphology:  - Erythrocytosis.    COMMENT:  The clinical findings suggest a reactive process.  However, with   hemoglobin of 17.1 g/dL, a myeloproliferative  neoplasm must be excluded.  Using peripheral blood, next generation   sequencingoncology, myeloproliferative  neoplasm including JAK2, CALR and MPL mutations is recommended.    Electronically signed out by:    Justo Langston M.D.    CLINICAL HISTORY:  54 years old female.  Indication for peripheral blood morphology: Elevated   hematocrit and hemoglobin.  History  of chronic smoking and low oxygen saturation.    PERIPHERAL BLOOD DATA:    Patient Value (Reference Range >18 year old female)  6.8      WBC    (4.0-11.0 x 10*9/L)  5.31     RBC    (3.8-5.2 x 10*12/L)  17.1     HGB    (11.7-15.7 g/dL)  50.0     HCT    (35.0-47.0 %)  94       MCV    (78-100fL)  32.2     MCH    (26.5-33.0 pg)  34.2     MCHC    (31.5-36.5 g/dL)  13.3     RDW    (10.0-15.0 %)  224      PLT    (150-450 x 10*9/L)  2.1      Retic    (0.5-2.0%)    PERIPHERAL BLOOD DIFFERENTIAL  (Reference ranges >18 year old)    Percent  59.1      Neutrophils, segmented and bands  28.6      Lymphocytes  9.2       Monocytes  2.7       Eosinophils  0.4       Basophils    Absolute  4.0      Neutrophils, segmented and bands    (1.6 - 8.3 x 10*9/L)  1.9      Lymphocytes    (0.8 - 5.3 x 10*9/L)  0.6      Monocytes    (0 -1.3 x 10*9/L)  0.2      Eosinophils    (0 - 0.7 x 10*9/L)  0.0      Basophils    (0 - 0.2 x 10*9/L)    PERIPHERAL MORPHOLOGY:    ERYTHROCYTES: The hemoglobin is recorded as 17.1 g/dL.  The  erythrocytes   are normocytic normochromic.    LEUKOCYTES: The white blood cell count is recorded as 6800.  The   differential count is within normal limits.  The neutrophils, monocytes and lymphocytes show mature morphology.  No   left shift, basophilia, monocytosis,  dysplastic changes or blasts are seen.    PLATELETS: The platelet count is recorded as 224,000.  The platelets show   normal size and granulation.  (Dictated by Justo Herron MD 6- @ 8:32AM).    CPT Codes:  A: 10187-EKRU    TESTING LAB LOCATION:  [address] [place]  [address] [phone]    COLLECTION SITE:  Client:  [facility]  Location:  [facility] (R)           ASSESSMENT    1. Polycythemia  2. Chronic cigarette smoker  3. Obesity and possibly obstructive sleep apnea    DISCUSSION   I extensively reviewed primary and secondary polycythemia which is a condition of increased red cell number and mass. This can be primary condition called as polycythemia vera or more commonly due a number of causes that lead to impaired tissue oxygenation or in setting of tumors making a lot of erythropoietin. The primary hematology condition - P. Vera is relatively rare and is treated with phlebotomies to address hyperviscosity from increased red cell mass. Hyperviscosity of blood in these patients can present as chest and abdominal pain, myalgia and weakness, fatigue, headache, blurred vision, transient loss of vision, paresthesias, slow mentation and/or a sense of depersonalization. Patients often have near normal life expectancies.     There a number of reasons that can impair tissue oxygenation and the increased red cell is to meet the demands for increased oxygen. The most benign of these is seen in subjects living at high altitude with low oxygen concentration. Other common ones include cigarette smoking, obstructive sleep apnea, COPD, asthma, valvular heart disease. Phlebotomy is not indicated  in secondary polycythemia as the increased red cell counts is due to an increased need of red cells for oxygen exchange. In this setting we have to treat underlying condition when possible. This would need a detailed work up which can be conducted by her PCP and include - PFT, echocardiogram, sleep study. This can be done in concert with cardiology and pulmonary services.     Phuong is chronic smoker and has been smoking for over 40 yrs now. Her polycythemia could be quite likely be related to her smoking and at least partly contributing to it. She is overweight/obese and does realize that she has to loose weight. Her obesity could also be contributing to her polycythemia.     Smoking: I have extensively counseled her against smoking and elaborated on the numerous hazards of smoking. She has diminished oxygen tension in her tissues. With continued smoking she will have higher risk for cancers of the jordy-digestive tracts, the lungs, the bladder, kidney and to a lesser extent several other sites. This is in addition to the risks of COPD, CAD, CHF, CVA. She does understand. We will reinforce at every clinic visit. She plans to try to quit smoking.     We also reviewed strategies to loose weight. Essentially she will have to cut down on her caloric intake and increase her exercise. 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 Encouraged her to resume physical activity.     I will get next generation sequencing done in peripheral blood for JAK2 mutation which is present in nearly 100% of polycythemia vera patients. I will check for erythropoietin levels, get peripheral smear.      PLAN   - I will get initial work up including iron panel with ferritin, EPO levels and NGS for JAK2, CALR and MPN mutations  - I will see her in a month to review results  - Encouraged healthy diet and physical exercise towards a goal of healthy weight  - Counseled to quit smoking.     Over 60 min of  direct face to face time spent with patient with more than 50% time spent in counseling and coordinating care.      Felipe Henderson ,  Division of Hematology, Oncology & Transplantation  [facility].       Again, thank you for allowing me to participate in the care of your patient.        Sincerely,        Felipe Mcdaniels MD

## 2019-08-06 NOTE — PROGRESS NOTES
[facility] CANCER CLINIC    NEW PATIENT VISIT NOTE    PATIENT NAME: Phuong New MRN # [id number]  DATE OF VISIT: August 6, 2019 YOB: 1965    REFERRING PROVIDER: Giovani Vang PA-C  [address] [place] [address]        HISTORY OF PRESENT ILLNESS   Phuong New is 54 year old female who has been referred with polycythemia.     She had been to her PCP for back pain and had routine blood evaluation which has revealed elevated red cell counts for which she has been referred to hematology. Her sister had breast cancer and leukemia. Her 3 sisters and her mother had breast cancer.     She feels achy in her abdomen. She does not feel regular. She also notes of lipoma in her left side of abdomen. She had been taking fiber and has stopped it and switched to probiotics. She feels that she has stomach problems all the time. Some days are worse for her than others.     She continues to have back pain. She has 2 bulging discs in her back. She had hip replacement in her left hip. She gets back pain every time she raises her arms. She cannot walk far due to back pain as she starts teetering.     She denies erythromelalgia. She does however note that she itches all the time. She uses lotions. She had hysterectomy in March 2018 for a fibrous tumor involving wall of uterus.     She has not been tested for obstructive sleep apnea but there have been some concerns for this. A sleep study was recommended during her pre-op physical prior to hip replacement but she needed cardiac stress test and coronary angiogram and could not afford the sutdy.     She gets headaches a lot. She also gets muscle aches -  Leg/arm/back muscles. She suspects she has arthritis involving her hands and feet.      PAST MEDICAL HISTORY     Past Medical History:   Diagnosis Date     Arthritis      Hot flashes      Hypercholesteraemia      Hypertension     No cariologist     Obese      Other chronic pain       Sleep apnea     Possible slep apnea          CURRENT OUTPATIENT MEDICATIONS     Current Outpatient Medications   Medication Sig     albuterol (2.5 MG/3ML) 0.083% nebulizer solution Take 1 vial (2.5 mg) by nebulization every 6 hours as needed for shortness of breath / dyspnea or wheezing     albuterol (ALBUTEROL) 108 (90 BASE) MCG/ACT inhaler Inhale 2 puffs into the lungs every 4 hours as needed for shortness of breath / dyspnea     aspirin (ASA) 81 MG EC tablet Take 1 tablet (81 mg) by mouth daily     ATORVASTATIN CALCIUM PO Take 40 mg by mouth daily     chlorthalidone (HYGROTON) 25 MG tablet Take 1 tablet (25 mg) by mouth daily     fluticasone (FLONASE) 50 MCG/ACT nasal spray Spray 2 sprays into both nostrils daily     gabapentin (NEURONTIN) 600 MG tablet TAKE ONE TABLET BY MOUTH DAILY     loratadine (CLARITIN) 10 MG tablet Take 10 mg by mouth daily     losartan (COZAAR) 100 MG tablet Take 1 tablet (100 mg) by mouth daily     omeprazole (PRILOSEC) 20 MG DR capsule TAKE ONE CAPSULE BY MOUTH ONCE DAILY     predniSONE (DELTASONE) 20 MG tablet Take 2 tablets (40 mg) by mouth daily     No current facility-administered medications for this visit.         ALLERGIES      Allergies   Allergen Reactions     Lisinopril Cough        SOCIAL HISTORY   She is  and lives with her . She has 2 grown up kids.     She is currently on social security disability. 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 She has been smoking since she was 14. Both her parents were smoker. She drinks daily - couple of drinks before dinner. She denies drug abuse.      FAMILY HISTORY   - She has 4 sisters and 3 of them had breast cancer; 4th sister had esophageal cancer  - Father  at 43 of lung cancer  - Mother  at 82 yrs of age;  of leukemia but CABG x 2, lung cancer  - She has 2 brothers who are healthy     REVIEW OF  "SYSTEMS   As above in the HPI, o/w complete 12-point ROS was negative.     PHYSICAL EXAM   /79   Pulse 90   Temp 98.1  F (36.7  C) (Tympanic)   Resp 16   Ht 1.6 m (5' 3\")   Wt 94.3 kg (207 lb 12.8 oz)   SpO2 91%   BMI 36.81 kg/m     @LASTSAO2(4)@  Wt Readings from Last 3 Encounters:   08/06/19 94.3 kg (207 lb 12.8 oz)   06/20/19 93.7 kg (206 lb 9.6 oz)   03/08/19 94.6 kg (208 lb 8 oz)     GEN: NAD  HEENT: PERRL, EOMI, no icterus, injection or pallor. Oropharynx is clear.  NECK: no cervical or supraclavicular lymphadenopathy  LUNGS: clear bilaterally  CV: regular, no murmurs, rubs, or gallops  ABDOMEN: soft, non-tender, non-distended, normal bowel sounds, no hepatosplenomegaly by percussion or palpation  EXT: warm, well perfused, no edema  NEURO: alert  SKIN: no rashes     LABORATORY AND IMAGING STUDIES     Recent Labs   Lab Test 03/01/19 08/11/16  0626 08/10/16  0540   POTASSIUM 4.0  --   --    CR 0.88  --  0.61   * 87 106*     No results for input(s): MAG, PHOS in the last 43181 hours.  Recent Labs   Lab Test 06/20/19  1331 08/11/16  0626 08/10/16  0540   WBC 6.8  --   --    HGB 17.1* 13.0 13.3     --   --    MCV 94  --   --    NEUTROPHIL 59.1  --   --      Recent Labs   Lab Test 03/01/19   ALT 37   AST 30     No results found for: TSH  No results for input(s): CEA in the last 23017 hours.  Results for orders placed or performed in visit on 06/20/19   XR Chest 2 Views    Narrative    XR CHEST 2 VW 6/20/2019 1:52 PM    HISTORY: Fatigue. Tobacco use. Cough.    COMPARISON: 8/5/2015.      Impression    IMPRESSION: 2 views of the chest show no acute cardiopulmonary disease  and no significant change.     ROS CRUZ MD       Lab Results   Component Value Date    PATH  06/20/2019     Patient Name: CRYS LOBO#: [id number]  Specimen #: [id number]  Collected: 6/20/2019  Received: 6/21/2019  Reported: 6/21/2019 08:33  Ordering Phy(s): MARIAM CAICEDO    For improved result formatting, " select 'View Enhanced Report Format' under   Linked Documents section.    TEST(S):  Peripheral Smear Morphology    FINAL DIAGNOSIS:  Peripheral blood morphology:  - Erythrocytosis.    COMMENT:  The clinical findings suggest a reactive process.  However, with   hemoglobin of 17.1 g/dL, a myeloproliferative  neoplasm must be excluded.  Using peripheral blood, next generation   sequencingoncology, myeloproliferative  neoplasm including JAK2, CALR and MPL mutations is recommended.    Electronically signed out by:    Justo Langston M.D.    CLINICAL HISTORY:  54 years old female.  Indication for peripheral blood morphology: Elevated   hematocrit and hemoglobin.  History  of chronic smoking and low oxygen saturation.    PERIPHERAL BLOOD DATA:    Patient Value (Reference Range >18 year old female)  6.8      WBC    (4.0-11.0 x 10*9/L)  5.31     RBC    (3.8-5.2 x 10*12/L)  17.1     HGB    (11.7-15.7 g/dL)  50.0     HCT    (35.0-47.0 %)  94       MCV    (78-100fL)  32.2     MCH    (26.5-33.0 pg)  34.2     MCHC    (31.5-36.5 g/dL)  13.3     RDW    (10.0-15.0 %)  224      PLT    (150-450 x 10*9/L)  2.1      Retic    (0.5-2.0%)    PERIPHERAL BLOOD DIFFERENTIAL  (Reference ranges >18 year old)    Percent  59.1      Neutrophils, segmented and bands  28.6      Lymphocytes  9.2       Monocytes  2.7       Eosinophils  0.4       Basophils    Absolute  4.0      Neutrophils, segmented and bands    (1.6 - 8.3 x 10*9/L)  1.9      Lymphocytes    (0.8 - 5.3 x 10*9/L)  0.6      Monocytes    (0 -1.3 x 10*9/L)  0.2      Eosinophils    (0 - 0.7 x 10*9/L)  0.0      Basophils    (0 - 0.2 x 10*9/L)    PERIPHERAL MORPHOLOGY:    ERYTHROCYTES: The hemoglobin is recorded as 17.1 g/dL.  The erythrocytes   are normocytic normochromic.    LEUKOCYTES: The white blood cell count is recorded as 6800.  The   differential count is within normal limits.  The neutrophils, monocytes and lymphocytes show mature morphology.  No   left shift, basophilia,  monocytosis,  dysplastic changes or blasts are seen.    PLATELETS: The platelet count is recorded as 224,000.  The platelets show   normal size and granulation.  (Dictated by Justo Herron MD 6- @ 8:32AM).    CPT Codes:  A: 65497-WQLR    TESTING LAB LOCATION:  [facility]  [address] [place]  [id number] [phone]    COLLECTION SITE:  Client:  [facility]  Location:  [facility] (R)           ASSESSMENT    1. Polycythemia  2. Chronic cigarette smoker  3. Obesity and possibly obstructive sleep apnea    DISCUSSION   I extensively reviewed primary and secondary polycythemia which is a condition of increased red cell number and mass. This can be primary condition called as polycythemia vera or more commonly due a number of causes that lead to impaired tissue oxygenation or in setting of tumors making a lot of erythropoietin. The primary hematology condition - P. Vera is relatively rare and is treated with phlebotomies to address hyperviscosity from increased red cell mass. Hyperviscosity of blood in these patients can present as chest and abdominal pain, myalgia and weakness, fatigue, headache, blurred vision, transient loss of vision, paresthesias, slow mentation and/or a sense of depersonalization. Patients often have near normal life expectancies.     There a number of reasons that can impair tissue oxygenation and the increased red cell is to meet the demands for increased oxygen. The most benign of these is seen in subjects living at high altitude with low oxygen concentration. Other common ones include cigarette smoking, obstructive sleep apnea, COPD, asthma, valvular heart disease. Phlebotomy is not indicated in secondary polycythemia as the increased red cell counts is due to an increased need of red cells for oxygen exchange. In this setting we have to treat underlying condition when possible. This would need a detailed work up which can be conducted by her  PCP and include - PFT, echocardiogram, sleep study. This can be done in concert with cardiology and pulmonary services.     Phuong is chronic smoker and has been smoking for over 40 yrs now. Her polycythemia could be quite likely be related to her smoking and at least partly contributing to it. She is overweight/obese and does realize that she has to loose weight. Her obesity could also be contributing to her polycythemia.     Smoking: I have extensively counseled her against smoking and elaborated on the numerous hazards of smoking. She has diminished oxygen tension in her tissues. With continued smoking she will have higher risk for cancers of the jordy-digestive tracts, the lungs, the bladder, kidney and to a lesser extent several other sites. This is in addition to the risks of COPD, CAD, CHF, CVA. She does understand. We will reinforce at every clinic visit. She plans to try to quit smoking.     We also reviewed strategies to loose weight. Essentially she will have to cut down on her caloric intake and increase her exercise. 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 Encouraged her to resume physical activity.     I will get next generation sequencing done in peripheral blood for JAK2 mutation which is present in nearly 100% of polycythemia vera patients. I will check for erythropoietin levels, get peripheral smear.      PLAN   - I will get initial work up including iron panel with ferritin, EPO levels and NGS for JAK2, CALR and MPN mutations  - I will see her in a month to review results  - Encouraged healthy diet and physical exercise towards a goal of healthy weight  - Counseled to quit smoking.     Over 60 min of direct face to face time spent with patient with more than 50% time spent in counseling and coordinating care.      Felipe Henderson ,  Division of Hematology, Oncology & Transplantation  [facility].

## 2019-08-06 NOTE — NURSING NOTE
"Oncology Rooming Note    August 6, 2019 12:56 PM   Phuong New is a 54 year old female who presents for:    Chief Complaint   Patient presents with     Oncology Clinic Visit     New Patient     Initial Vitals: /79   Pulse 90   Temp 98.1  F (36.7  C) (Tympanic)   Resp 16   Ht 1.6 m (5' 3\")   Wt 94.3 kg (207 lb 12.8 oz)   SpO2 91%   BMI 36.81 kg/m   Estimated body mass index is 36.81 kg/m  as calculated from the following:    Height as of this encounter: 1.6 m (5' 3\").    Weight as of this encounter: 94.3 kg (207 lb 12.8 oz). Body surface area is 2.05 meters squared.  No Pain (0) Comment: Data Unavailable   No LMP recorded. Patient is postmenopausal.  Allergies reviewed: Yes  Medications reviewed: Yes    Medications: Medication refills not needed today.  Pharmacy name entered into [employer]: [facility] - [place], [place] - [address]    Clinical concerns: New Patient       Soheila Adams CMA              "

## 2019-08-07 LAB — EPO SERPL-ACNC: 15 MU/ML (ref 4–27)

## 2019-09-25 ENCOUNTER — TELEPHONE (OUTPATIENT)
Dept: FAMILY MEDICINE | Facility: CLINIC | Age: 54
End: 2019-09-25

## 2019-09-25 DIAGNOSIS — I10 ESSENTIAL HYPERTENSION: Primary | ICD-10-CM

## 2019-09-25 DIAGNOSIS — E78.2 MIXED HYPERLIPIDEMIA: ICD-10-CM

## 2019-09-25 NOTE — TELEPHONE ENCOUNTER
Refill:   ATORVASTATIN CALCIUM PO 40 mg, DAILY     Requesting alternative medication to:  losartan (COZAAR) 100 MG tablet 100 mg, DAILY     Reason for call:  Other   Patient called regarding (reason for call): prescription    Additional comments: Pt called with request for refill of Atorvastatin. This Rx was with a previous provider, but would like it refilled with PCP. Pt is also requesting an alternative to the Losartan, due to recent recalls of the medication. Please contact the pt to advise on the treatment plan.    Phone number to reach patient:  Home number on file [phone] (home)    Best Time:  Any    Can we leave a detailed message on this number?  YES

## 2019-09-26 RX ORDER — ATORVASTATIN CALCIUM 40 MG/1
40 TABLET, FILM COATED ORAL DAILY
Qty: 90 TABLET | Refills: 0 | Status: SHIPPED | OUTPATIENT
Start: 2019-09-26 | End: 2019-12-31

## 2019-09-26 RX ORDER — IRBESARTAN 150 MG/1
150 TABLET ORAL AT BEDTIME
Qty: 90 TABLET | Refills: 0 | Status: SHIPPED | OUTPATIENT
Start: 2019-09-26 | End: 2019-12-27

## 2019-09-26 NOTE — TELEPHONE ENCOUNTER
Called patient and LM to return call to clinic. Medication was refilled but patient needs to know of change made.   Lelo Headley MA

## 2019-09-26 NOTE — TELEPHONE ENCOUNTER
Patient established care at  with PACHECO Vang in March 2019. Last lipid panel at [facility] March 2019.     Routing refill request for atorvastatin to provider for review/approval because: Medication is reported/historical  Routing prescription for irbesartan to provider for review/approval because:  Therapeutic substitution for losartan requested by patient (see below)    Atorvastatin, 40 mg daily  Last Written Prescription Date:  historical  Last Fill Quantity: n/a,  # refills: n/a     Losartan, 100 mg daily (requesting alternative d/t recall)  Last Written Prescription Date:  7/22/19  Last Fill Quantity: 90,  # refills: 1     Last office visit: 6/20/2019 with prescribing provider:  MARCEL Vang     Future Office Visit:   Next 5 appointments (look out 90 days)    Sep 27, 2019 11:15 AM CDT  Return Visit with Felipe Mcdaniels MD  [facility] ([facility]) [facility]  [address]  [facility] [address]  [phone]         Called pharmacy, nothing official through them at this time, they will call if her /lot number is included in recall.  Called patient: Informed her covering provider for [facility] will address these refills.  Patient states when she looks online, her pill is not included (her 's is - same , Torrent). She is worried her lot number will be posted in recall, new lot numbers are being posted daily), would like to proactively switch to another medication.  Patient states she has never tried any of the other medications in this drug class besides losartan.    Per Therapeutic Substitution Chart:  Angiotensin II Receptor Blocker (ARBs)  Approximate ARB Daily Dose Conversion Table  Drug     Low Dose  Inter Dose  High Dose  Irbesartan (Avapro)   *75   150   300  **Olmesartan (Benicar)  < 20   20   40  Candesartan (Atacand)  8   16   32  Eprosartan (Teveten)   400   600 - 800  -  Losartan (Cozaar)   50   100   -  Telmisartan  (Micardis)  20   40   80  Valsartan (Diovan)   80   160   320  Azilsartan (Edarbi)   40   80   -    * Irbesartan 75mg dose is reserved for patients with volume or salt depletion and children <12  ** Olmesartan (Benicar) lowers the DBP & SBP approximately 3- 4 mmHg more than the corresponding dose of the other ARBs.    Shadia Tellez RN

## 2019-09-27 ENCOUNTER — ONCOLOGY VISIT (OUTPATIENT)
Dept: ONCOLOGY | Facility: CLINIC | Age: 54
End: 2019-09-27
Attending: INTERNAL MEDICINE
Payer: COMMERCIAL

## 2019-09-27 ENCOUNTER — HOSPITAL ENCOUNTER (OUTPATIENT)
Facility: CLINIC | Age: 54
Setting detail: SPECIMEN
Discharge: HOME | End: 2019-09-27
Attending: INTERNAL MEDICINE | Admitting: INTERNAL MEDICINE
Payer: COMMERCIAL

## 2019-09-27 VITALS
TEMPERATURE: 98.7 F | OXYGEN SATURATION: 94 % | HEART RATE: 92 BPM | SYSTOLIC BLOOD PRESSURE: 118 MMHG | BODY MASS INDEX: 37.39 KG/M2 | WEIGHT: 211 LBS | RESPIRATION RATE: 16 BRPM | DIASTOLIC BLOOD PRESSURE: 84 MMHG | HEIGHT: 63 IN

## 2019-09-27 DIAGNOSIS — R71.8 ELEVATED HEMATOCRIT: Primary | ICD-10-CM

## 2019-09-27 DIAGNOSIS — E83.110 HEREDITARY HEMOCHROMATOSIS (H): ICD-10-CM

## 2019-09-27 LAB
BASOPHILS # BLD AUTO: 0 10E9/L (ref 0–0.2)
BASOPHILS NFR BLD AUTO: 0.6 %
DIFFERENTIAL METHOD BLD: ABNORMAL
EOSINOPHIL # BLD AUTO: 0.2 10E9/L (ref 0–0.7)
EOSINOPHIL NFR BLD AUTO: 3.4 %
ERYTHROCYTE [DISTWIDTH] IN BLOOD BY AUTOMATED COUNT: 12.9 % (ref 10–15)
HCT VFR BLD AUTO: 54 % (ref 35–47)
HGB BLD-MCNC: 18.5 G/DL (ref 11.7–15.7)
IMM GRANULOCYTES # BLD: 0 10E9/L (ref 0–0.4)
IMM GRANULOCYTES NFR BLD: 0.3 %
LDH SERPL L TO P-CCNC: 202 U/L (ref 81–234)
LYMPHOCYTES # BLD AUTO: 1.7 10E9/L (ref 0.8–5.3)
LYMPHOCYTES NFR BLD AUTO: 24.3 %
MCH RBC QN AUTO: 32.3 PG (ref 26.5–33)
MCHC RBC AUTO-ENTMCNC: 34.3 G/DL (ref 31.5–36.5)
MCV RBC AUTO: 94 FL (ref 78–100)
MONOCYTES # BLD AUTO: 0.6 10E9/L (ref 0–1.3)
MONOCYTES NFR BLD AUTO: 8.3 %
NEUTROPHILS # BLD AUTO: 4.5 10E9/L (ref 1.6–8.3)
NEUTROPHILS NFR BLD AUTO: 63.1 %
NRBC # BLD AUTO: 0 10*3/UL
NRBC BLD AUTO-RTO: 0 /100
PLATELET # BLD AUTO: 238 10E9/L (ref 150–450)
RBC # BLD AUTO: 5.73 10E12/L (ref 3.8–5.2)
WBC # BLD AUTO: 7.2 10E9/L (ref 4–11)

## 2019-09-27 PROCEDURE — 81450 HL NEO GSAP 5-50DNA/DNA&RNA: CPT | Performed by: INTERNAL MEDICINE

## 2019-09-27 PROCEDURE — 81219 CALR GENE COM VARIANTS: CPT | Performed by: INTERNAL MEDICINE

## 2019-09-27 PROCEDURE — 81403 MOPATH PROCEDURE LEVEL 4: CPT | Performed by: INTERNAL MEDICINE

## 2019-09-27 PROCEDURE — 85025 COMPLETE CBC W/AUTO DIFF WBC: CPT | Performed by: INTERNAL MEDICINE

## 2019-09-27 PROCEDURE — 83615 LACTATE (LD) (LDH) ENZYME: CPT | Performed by: INTERNAL MEDICINE

## 2019-09-27 PROCEDURE — G0463 HOSPITAL OUTPT CLINIC VISIT: HCPCS

## 2019-09-27 PROCEDURE — 99214 OFFICE O/P EST MOD 30 MIN: CPT | Performed by: INTERNAL MEDICINE

## 2019-09-27 ASSESSMENT — MIFFLIN-ST. JEOR: SCORE: 1516.22

## 2019-09-27 ASSESSMENT — PAIN SCALES - GENERAL: PAINLEVEL: SEVERE PAIN (6)

## 2019-09-27 NOTE — NURSING NOTE
"Oncology Rooming Note    September 27, 2019 11:28 AM   Phuong New is a 54 year old female who presents for:    Chief Complaint   Patient presents with     Oncology Clinic Visit     Hematology Elevated hematocrit      Initial Vitals: /84   Pulse 92   Temp 98.7  F (37.1  C) (Oral)   Resp 16   Ht 1.6 m (5' 3\")   Wt 95.7 kg (211 lb)   SpO2 94%   BMI 37.38 kg/m   Estimated body mass index is 37.38 kg/m  as calculated from the following:    Height as of this encounter: 1.6 m (5' 3\").    Weight as of this encounter: 95.7 kg (211 lb). Body surface area is 2.06 meters squared.  Severe Pain (6) Comment: Data Unavailable   No LMP recorded. Patient is postmenopausal.  Allergies reviewed: Yes  Medications reviewed: Yes    Medications: Medication refills not needed today.  Pharmacy name entered into [facility]: [facility] - [place], [place] - [address]    Clinical concerns: Follow up       Katharine Wahl CMA              "

## 2019-09-27 NOTE — PROGRESS NOTES
[facility]  HEMATOLOGY AND ONCOLOGY    FOLLOW-UP VISIT NOTE    PATIENT NAME: Phuong New MRN # [id number]  DATE OF VISIT: Sep 27, 2019 YOB: 1965    REFERRING PROVIDER: Giovani Vang PA-C  [address] [place] [address]         SUBJECTIVE   Phuong New is being followed for polycythemia    Phuong is accompanied by her  at this clinic visit.  She continues to have headaches.  She has itching all over her body.  This is not related to the showers.  She had a similar itching last year in August at about the same time.  She has cough which is worse when she lays down.      PAST MEDICAL HISTORY     Past Medical History:   Diagnosis Date     Arthritis      Hot flashes      Hypercholesteraemia      Hypertension     No cariologist     Obese      Other chronic pain      Sleep apnea     Possible slep apnea         CURRENT OUTPATIENT MEDICATIONS     Current Outpatient Medications   Medication Sig     albuterol (2.5 MG/3ML) 0.083% nebulizer solution Take 1 vial (2.5 mg) by nebulization every 6 hours as needed for shortness of breath / dyspnea or wheezing     albuterol (ALBUTEROL) 108 (90 BASE) MCG/ACT inhaler Inhale 2 puffs into the lungs every 4 hours as needed for shortness of breath / dyspnea     aspirin (ASA) 81 MG EC tablet Take 1 tablet (81 mg) by mouth daily     atorvastatin (LIPITOR) 40 MG tablet Take 1 tablet (40 mg) by mouth daily     ATORVASTATIN CALCIUM PO Take 40 mg by mouth daily     chlorthalidone (HYGROTON) 25 MG tablet Take 1 tablet (25 mg) by mouth daily     gabapentin (NEURONTIN) 600 MG tablet TAKE ONE TABLET BY MOUTH DAILY     irbesartan (AVAPRO) 150 MG tablet Take 1 tablet (150 mg) by mouth At Bedtime     loratadine (CLARITIN) 10 MG tablet Take 10 mg by mouth daily     omeprazole (PRILOSEC) 20 MG DR capsule TAKE ONE CAPSULE BY MOUTH ONCE DAILY     fluticasone (FLONASE) 50 MCG/ACT nasal spray Spray 2 sprays into both nostrils daily (Patient not taking:  "Reported on 9/27/2019)     predniSONE (DELTASONE) 20 MG tablet Take 2 tablets (40 mg) by mouth daily (Patient not taking: Reported on 9/27/2019)     No current facility-administered medications for this visit.         ALLERGIES      Allergies   Allergen Reactions     Lisinopril Cough        REVIEW OF SYSTEMS   As above in the HPI, o/w complete 12-point ROS was negative.     PHYSICAL EXAM   /84   Pulse 92   Temp 98.7  F (37.1  C) (Oral)   Resp 16   Ht 1.6 m (5' 3\")   Wt 95.7 kg (211 lb)   SpO2 94%   BMI 37.38 kg/m    GEN: NAD  HEENT: PERRL, EOMI, no icterus, injection or pallor. Oropharynx is clear.  LYMPHATICs: no cervical or supraclavicular lymphadenopathy; no other abn lymphadenopathy  PULMONARY: clear with good air entry bilaterally  CARDIOVASCULAR: regular, no murmurs, rubs, or gallops  GASTROINTESTINAL: soft, non-tender, non-distended, normal bowel sounds, no hepatosplenomegaly by percussion or palpation  MUSCULOSKELTAL: warm, well perfused, no edema  NEURO: awake, alert and oriented to time place and person, cranial nerves intact - II - XII, no focal neurologic deficits  SKIN: no rashes     LABORATORY AND IMAGING STUDIES     Recent Labs   Lab Test 03/01/19 08/11/16  0626 08/10/16  0540   POTASSIUM 4.0  --   --    CR 0.88  --  0.61   * 87 106*     No results for input(s): MAG, PHOS in the last 75471 hours.  Recent Labs   Lab Test 06/20/19  1331 08/11/16  0626 08/10/16  0540   WBC 6.8  --   --    HGB 17.1* 13.0 13.3     --   --    MCV 94  --   --    NEUTROPHIL 59.1  --   --      Recent Labs   Lab Test 08/06/19  1427 03/01/19   ALT  --  37   AST  --  30     --      Lab Results   Component Value Date    PATH  06/20/2019     Patient Name: CRYS LOBO  MR#: [id number]  Specimen #: [id number]  Collected: 6/20/2019  Received: 6/21/2019  Reported: 6/21/2019 08:33  Ordering Phy(s): MARIAM CAICEDO    For improved result formatting, select 'View Enhanced Report Format' under   " Linked Documents section.    TEST(S):  Peripheral Smear Morphology    FINAL DIAGNOSIS:  Peripheral blood morphology:  - Erythrocytosis.    COMMENT:  The clinical findings suggest a reactive process.  However, with   hemoglobin of 17.1 g/dL, a myeloproliferative  neoplasm must be excluded.  Using peripheral blood, next generation   sequencingoncology, myeloproliferative  neoplasm including JAK2, CALR and MPL mutations is recommended.    Electronically signed out by:    Justo Langston M.D.    CLINICAL HISTORY:  54 years old female.  Indication for peripheral blood morphology: Elevated   hematocrit and hemoglobin.  History  of chronic smoking and low oxygen saturation.    PERIPHERAL BLOOD DATA:    Patient Value (Reference Range >18 year old female)  6.8      WBC    (4.0-11.0 x 10*9/L)  5.31     RBC    (3.8-5.2 x 10*12/L)  17.1     HGB    (11.7-15.7 g/dL)  50.0     HCT    (35.0-47.0 %)  94       MCV    (78-100fL)  32.2     MCH    (26.5-33.0 pg)  34.2     MCHC    (31.5-36.5 g/dL)  13.3     RDW    (10.0-15.0 %)  224      PLT    (150-450 x 10*9/L)  2.1      Retic    (0.5-2.0%)    PERIPHERAL BLOOD DIFFERENTIAL  (Reference ranges >18 year old)    Percent  59.1      Neutrophils, segmented and bands  28.6      Lymphocytes  9.2       Monocytes  2.7       Eosinophils  0.4       Basophils    Absolute  4.0      Neutrophils, segmented and bands    (1.6 - 8.3 x 10*9/L)  1.9      Lymphocytes    (0.8 - 5.3 x 10*9/L)  0.6      Monocytes    (0 -1.3 x 10*9/L)  0.2      Eosinophils    (0 - 0.7 x 10*9/L)  0.0      Basophils    (0 - 0.2 x 10*9/L)    PERIPHERAL MORPHOLOGY:    ERYTHROCYTES: The hemoglobin is recorded as 17.1 g/dL.  The erythrocytes   are normocytic normochromic.    LEUKOCYTES: The white blood cell count is recorded as 6800.  The   differential count is within normal limits.  The neutrophils, monocytes and lymphocytes show mature morphology.  No   left shift, basophilia, monocytosis,  dysplastic changes or blasts are  seen.    PLATELETS: The platelet count is recorded as 224,000.  The platelets show   normal size and granulation.  (Dictated by Justo Herron MD 6- @ 8:32AM).    CPT Codes:  A: 23801-JQYX    TESTING LAB LOCATION:  [facility]  [address] [place]  [address]  [phone]    COLLECTION SITE:  Client:  [facility]  Location:  [facility] (R)        Results for CRYS LOBO (MRN [id number]) as of 9/27/2019 12:54   Ref. Range 8/6/2019 14:27 9/27/2019 12:00   Erythropoietin Latest Ref Range: 4 - 27 mU/mL 15    Ferritin Latest Ref Range: 8 - 252 ng/mL 88    Iron Latest Ref Range: 35 - 180 ug/dL 117    Iron Binding Cap Latest Ref Range: 240 - 430 ug/dL 380    Iron Saturation Index Latest Ref Range: 15 - 46 % 31    Lactate Dehydrogenase Latest Ref Range: 81 - 234 U/L 189 202      ASSESSMENT AND PLAN   1. Polycythemia  2. Chronic cigarette smoker  3. Obesity and possibly obstructive sleep apnea    I again extensively reviewed primary and secondary polycythemia which is a condition of increased red cell number and mass. I explained to her that polycythemia vera is due to the presence of a genetic mutation involving JAK2, CALR or MPN genes.  It is treated with phlebotomies to address hyperviscosity from increased red cell mass. Hyperviscosity of blood in these patients can present as chest and abdominal pain, myalgia and weakness, fatigue, headache, blurred vision, transient loss of vision, paresthesias, slow mentation and/or a sense of depersonalization. Patients often have near normal life expectancies.      There a number of reasons that can impair tissue oxygenation and the increased red cell is to meet the demands for increased oxygen including cigarette smoking, obstructive sleep apnea, COPD, asthma, valvular heart disease. Phlebotomy is not indicated in secondary polycythemia as the increased red cell counts is due to an increased need of red cells for oxygen exchange.  In this setting we have to treat underlying condition when possible.      Phuong is chronic smoker and has been smoking for over 40 yrs now. Her polycythemia could be quite likely be related to her smoking and at least partly contributing to it. She is overweight/obese and does realize that she has to loose weight. Her obesity could also be contributing to her polycythemia.     Unfortunately somehow the next generation sequencing to analyze for her genetic mutations was missed at the last visit.  I will repeat her CBC and also the next generation today.  I will call her with the results.  If she indeed has polycythemia vera then we will have to bring her in for phlebotomies.  It is extremely unlikely that her current symptoms are from her polycythemia vera.  However she is anxious as expected as she does not have a diagnosis for the last 3 to 4 months.    Over 25 min of direct face to face time spent with patient with more than 50% time spent in counseling and coordinating care.

## 2019-10-08 LAB — COPATH REPORT: NORMAL

## 2019-10-29 ENCOUNTER — ONCOLOGY VISIT (OUTPATIENT)
Dept: ONCOLOGY | Facility: CLINIC | Age: 54
End: 2019-10-29
Attending: INTERNAL MEDICINE
Payer: COMMERCIAL

## 2019-10-29 VITALS
BODY MASS INDEX: 38.06 KG/M2 | RESPIRATION RATE: 16 BRPM | WEIGHT: 214.8 LBS | HEIGHT: 63 IN | SYSTOLIC BLOOD PRESSURE: 122 MMHG | OXYGEN SATURATION: 95 % | TEMPERATURE: 97.9 F | DIASTOLIC BLOOD PRESSURE: 85 MMHG | HEART RATE: 88 BPM

## 2019-10-29 DIAGNOSIS — R71.8 ELEVATED HEMATOCRIT: Primary | ICD-10-CM

## 2019-10-29 DIAGNOSIS — E83.110 HEREDITARY HEMOCHROMATOSIS (H): ICD-10-CM

## 2019-10-29 PROCEDURE — G0463 HOSPITAL OUTPT CLINIC VISIT: HCPCS

## 2019-10-29 PROCEDURE — 99215 OFFICE O/P EST HI 40 MIN: CPT | Performed by: INTERNAL MEDICINE

## 2019-10-29 ASSESSMENT — PAIN SCALES - GENERAL: PAINLEVEL: NO PAIN (0)

## 2019-10-29 ASSESSMENT — MIFFLIN-ST. JEOR: SCORE: 1533.46

## 2019-10-29 NOTE — LETTER
10/29/2019         RE: Phuong New  [address] [facility] [address]        Dear Colleague,    Thank you for referring your patient, Phuong New, to the [facility]. Please see a copy of my visit note below.    [facility]  HEMATOLOGY AND ONCOLOGY    FOLLOW-UP VISIT NOTE    PATIENT NAME: Phuong New MRN # [id number]  DATE OF VISIT: Oct 29, 2019 YOB: 1965    REFERRING PROVIDER: Giovani Vang PA-C  [address] [place] [address]         SUBJECTIVE   Phuong New is being followed for polycythemia    Phuong is alone at this clinic visit.  She denies any new complains. She is being seen with follow up labs.       PAST MEDICAL HISTORY     Past Medical History:   Diagnosis Date     Arthritis      Hot flashes      Hypercholesteraemia      Hypertension     No cariologist     Obese      Other chronic pain      Sleep apnea     Possible slep apnea         CURRENT OUTPATIENT MEDICATIONS     Current Outpatient Medications   Medication Sig     albuterol (2.5 MG/3ML) 0.083% nebulizer solution Take 1 vial (2.5 mg) by nebulization every 6 hours as needed for shortness of breath / dyspnea or wheezing     albuterol (ALBUTEROL) 108 (90 BASE) MCG/ACT inhaler Inhale 2 puffs into the lungs every 4 hours as needed for shortness of breath / dyspnea     aspirin (ASA) 81 MG EC tablet Take 1 tablet (81 mg) by mouth daily     atorvastatin (LIPITOR) 40 MG tablet Take 1 tablet (40 mg) by mouth daily     ATORVASTATIN CALCIUM PO Take 40 mg by mouth daily     chlorthalidone (HYGROTON) 25 MG tablet Take 1 tablet (25 mg) by mouth daily     fluticasone (FLONASE) 50 MCG/ACT nasal spray Spray 2 sprays into both nostrils daily     gabapentin (NEURONTIN) 600 MG tablet TAKE ONE TABLET BY MOUTH DAILY     irbesartan (AVAPRO) 150 MG tablet Take 1 tablet (150 mg) by mouth At Bedtime     loratadine (CLARITIN) 10 MG tablet Take 10 mg by mouth daily     omeprazole (PRILOSEC) 20 MG   "capsule TAKE ONE CAPSULE BY MOUTH ONCE DAILY     predniSONE (DELTASONE) 20 MG tablet Take 2 tablets (40 mg) by mouth daily     No current facility-administered medications for this visit.         ALLERGIES      Allergies   Allergen Reactions     Lisinopril Cough        REVIEW OF SYSTEMS   As above in the HPI, o/w complete 12-point ROS was negative.     PHYSICAL EXAM   /85   Pulse 88   Temp 97.9  F (36.6  C) (Tympanic)   Resp 16   Ht 1.6 m (5' 3\")   Wt 97.4 kg (214 lb 12.8 oz)   SpO2 95%   BMI 38.05 kg/m     GEN: NAD  HEENT: PERRL, EOMI, no icterus, injection or pallor. Oropharynx is clear.  LYMPHATICs: no cervical or supraclavicular lymphadenopathy; no other abn lymphadenopathy  PULMONARY: clear with good air entry bilaterally  CARDIOVASCULAR: regular, no murmurs, rubs, or gallops  GASTROINTESTINAL: soft, non-tender, non-distended, normal bowel sounds, no hepatosplenomegaly by percussion or palpation  MUSCULOSKELTAL: warm, well perfused, no edema  NEURO: awake, alert and oriented to time place and person, cranial nerves intact - II - XII, no focal neurologic deficits  SKIN: no rashes     LABORATORY AND IMAGING STUDIES     Recent Labs   Lab Test 03/01/19 08/11/16  0626 08/10/16  0540   POTASSIUM 4.0  --   --    CR 0.88  --  0.61   * 87 106*     No results for input(s): MAG, PHOS in the last 50188 hours.  Recent Labs   Lab Test 06/20/19  1331 08/11/16  0626 08/10/16  0540   WBC 6.8  --   --    HGB 17.1* 13.0 13.3     --   --    MCV 94  --   --    NEUTROPHIL 59.1  --   --      Recent Labs   Lab Test 08/06/19  1427 03/01/19   ALT  --  37   AST  --  30     --      Lab Results   Component Value Date    PATH  09/27/2019     Patient Name: CRYS LOBO  MR#: [id number]  Specimen #: [id number]  Collected: 9/27/2019 12:00  Received: 9/27/2019 17:32  Reported: 10/8/2019 15:07  Ordering Phy(s): LO DIALLO    For improved result formatting, select 'View Enhanced Report Format' under   " Linked Documents section.  _________________________________________    TEST(S) REQUESTED:  Myeloproliferative Expanded NGSO    SPECIMEN DESCRIPTION:  Blood    SIGNIFICANT RESULTS    ---------------------------------------------------------------------  Detected Alterations of Known or Potential Pathogenicity: None    Detected Alterations of Uncertain Significance: None    Genes with No Detected Alterations of the Amino Acid Sequence: CALR, JAK2, MPL  ---------------------------------------------------------------------     INTERPRETATION OF RESULTS     ---------------------------------------------------------------------   No mutations were identified in the analyzed gene regions of JAK2, CALR, or MPL.    Nearly 100% of polycythemia vera (PV) cases have either JAK2 V617F or an exon 12 mutation. As this patient's sample is negative for mutations in these regions, PV is unlikely. JAK2, CALR, or MPL mutations have also been reported in both essential thrombocythemia (ET) and primary myelofibrosis  (PMF) at variable prevalence [1-3].  However, the absence of mutations does not exclude these diagnoses as approximately 10-15% of ET or PMF may be negative for mutations in these 3 genes.    Correlation with clinical information, morphology, and other diagnostic   tests is indicated.    References:  1. Ariadna SH, Anand E, Chris NL, et al. WHO Classification of Tumours   of Haematopoietic and Lymphoid  Tissues (IARC WHO Classification of Tumours) Revised Edition, IARC Press;   2017.  2. Melissa BAZZI, Mohit RL, Debbie T, et al.  mutations in   myeloproliferative and other myeloid  disorders: a study of 1182 patients. Blood. 2006;108(10):3472-6.  3. Anup T, Solo H, Howard AS, et al. Somatic mutations of   calreticulin in myeloproliferative  neoplasms. N Engl J Med. 2013;369(25):8729-22.   ---------------------------------------------------------------------      CALR (NM_004343.3): 9; JAK2  (NM_004972.3): 12-14,16,20; MPL (NM_005373.2):   10    Less than 500X coverage was achieved in portions of the following exons;   additional mutations in these regions  cannot be entirely excluded: None    ---------------------------------------------------------------------  Electronic Signature  ---------------------------------------------------------------------  Electronically signed/cosigned by: Monisha Sousa  10/07/2019    Electronically Signed Out By:  Tyson Son M.D., PhD  [employer]    CPT Codes:  A: -STUOZK, [id number]    TESTING LAB LOCATION:  [address] [address] [phone]    COLLECTION SITE:  Client[facility]  [facility]        Results for CRYS LOBO (MRN [id number]) as of 9/27/2019 12:54   Ref. Range 8/6/2019 14:27 9/27/2019 12:00   Erythropoietin Latest Ref Range: 4 - 27 mU/mL 15    Ferritin Latest Ref Range: 8 - 252 ng/mL 88    Iron Latest Ref Range: 35 - 180 ug/dL 117    Iron Binding Cap Latest Ref Range: 240 - 430 ug/dL 380    Iron Saturation Index Latest Ref Range: 15 - 46 % 31    Lactate Dehydrogenase Latest Ref Range: 81 - 234 U/L 189 202      ASSESSMENT AND PLAN   1. Polycythemia  2. Chronic cigarette smoker  3. Obesity and possibly obstructive sleep apnea    I again extensively reviewed primary and secondary polycythemia which is a condition of increased red cell number and mass. I explained to her that polycythemia vera is due to the presence of a genetic mutation involving JAK2, CALR or MPN genes. She does not have primary polycythemia vera as she does not have either of these mutation on the peripheral blood next generation sequencing.      There a number of reasons that can impair tissue oxygenation and the increased red cell is to meet the demands for increased oxygen including cigarette smoking, obstructive sleep apnea, COPD, asthma, valvular heart  disease. Phlebotomy is not indicated in secondary polycythemia as the increased red cell counts is due to an increased need of red cells for oxygen exchange. In this setting we have to treat underlying condition when possible.      Phuong is chronic smoker and has been smoking for over 40 yrs now. Her polycythemia could be quite likely be related to her smoking and at least partly contributing to it. She is overweight/obese and does realize that she has to loose weight. Her obesity could also be contributing to her polycythemia.  She could have obstructive sleep apnea or diastolic dysfunction of heart which can also lead to polycythemia.     We would need additional evaluation for these including lung function tests, echocardiogram and sleep studies. If none of these explain the polycythemia then we might need a bone marrow biopsy.     I have extensively reviewed the procedure for bone marrow biopsy with him. This is performed as an outpatient under sedation and with local anaesethetic. Bone marrow is the soft tissue inside bones that helps form blood cells. It is found in the hollow part of most bones. The procedure involves collection of bone marrow and a small amount of marrow fluid aspirate for analysis with a biopsy/aspirate needle. Only the finished cells are released in the circulation and bone marrow biopsy/aspirate helps assess precursor cells for abnormalities. It helps establish primary hematologic disorders like myelodysplastic/ myeloproliferative disorders, leukemia, lymphoma and multiple myeloma.      I will get all of the above evaluations including the bone marrow biopsy prior to next visit.     Over 45 min of direct face to face time spent with patient with more than 50% time spent in counseling and coordinating care.      Again, thank you for allowing me to participate in the care of your patient.        Sincerely,        Felipe Mcdaniels MD

## 2019-10-29 NOTE — NURSING NOTE
"Oncology Rooming Note    October 29, 2019 4:01 PM   Phuong New is a 54 year old female who presents for:    Chief Complaint   Patient presents with     Oncology Clinic Visit     Elevated hematocrit      Initial Vitals: /85   Pulse 88   Temp 97.9  F (36.6  C) (Tympanic)   Resp 16   Ht 1.6 m (5' 3\")   Wt 97.4 kg (214 lb 12.8 oz)   SpO2 95%   BMI 38.05 kg/m   Estimated body mass index is 38.05 kg/m  as calculated from the following:    Height as of this encounter: 1.6 m (5' 3\").    Weight as of this encounter: 97.4 kg (214 lb 12.8 oz). Body surface area is 2.08 meters squared.  No Pain (0) Comment: Data Unavailable   No LMP recorded. Patient is postmenopausal.  Allergies reviewed: Yes  Medications reviewed: Yes    Medications: Medication refills not needed today.  Pharmacy name entered into Involver: [facility] - [place], [place] - [address]    Clinical concerns: Follow Up       Soheila Adams CMA              "

## 2019-10-29 NOTE — PATIENT INSTRUCTIONS
Phuong is scheduled for her Bone Marrow Biopsy Friday November 8, 2019 at 10:00 am.  She will check in to same day surgery center at [facility].  She will be called by an RN with all information.   Phuong is scheduled for her sleep study consult on December 16, 2019 at 2:30  [address] of the [facility]

## 2019-10-29 NOTE — PROGRESS NOTES
[facility]  HEMATOLOGY AND ONCOLOGY    FOLLOW-UP VISIT NOTE    PATIENT NAME: Phuong New MRN # [id number]  DATE OF VISIT: Oct 29, 2019 YOB: 1965    REFERRING PROVIDER: Giovani Vang PA-C  [address] [place] [address]         SUBJECTIVE   Phuong New is being followed for polycythemia    Phuong is alone at this clinic visit.  She denies any new complains. She is being seen with follow up labs.       PAST MEDICAL HISTORY     Past Medical History:   Diagnosis Date     Arthritis      Hot flashes      Hypercholesteraemia      Hypertension     No cariologist     Obese      Other chronic pain      Sleep apnea     Possible slep apnea         CURRENT OUTPATIENT MEDICATIONS     Current Outpatient Medications   Medication Sig     albuterol (2.5 MG/3ML) 0.083% nebulizer solution Take 1 vial (2.5 mg) by nebulization every 6 hours as needed for shortness of breath / dyspnea or wheezing     albuterol (ALBUTEROL) 108 (90 BASE) MCG/ACT inhaler Inhale 2 puffs into the lungs every 4 hours as needed for shortness of breath / dyspnea     aspirin (ASA) 81 MG EC tablet Take 1 tablet (81 mg) by mouth daily     atorvastatin (LIPITOR) 40 MG tablet Take 1 tablet (40 mg) by mouth daily     ATORVASTATIN CALCIUM PO Take 40 mg by mouth daily     chlorthalidone (HYGROTON) 25 MG tablet Take 1 tablet (25 mg) by mouth daily     fluticasone (FLONASE) 50 MCG/ACT nasal spray Spray 2 sprays into both nostrils daily     gabapentin (NEURONTIN) 600 MG tablet TAKE ONE TABLET BY MOUTH DAILY     irbesartan (AVAPRO) 150 MG tablet Take 1 tablet (150 mg) by mouth At Bedtime     loratadine (CLARITIN) 10 MG tablet Take 10 mg by mouth daily     omeprazole (PRILOSEC) 20 MG DR capsule TAKE ONE CAPSULE BY MOUTH ONCE DAILY     predniSONE (DELTASONE) 20 MG tablet Take 2 tablets (40 mg) by mouth daily     No current facility-administered medications for this visit.         ALLERGIES      Allergies   Allergen Reactions  "    Lisinopril Cough        REVIEW OF SYSTEMS   As above in the HPI, o/w complete 12-point ROS was negative.     PHYSICAL EXAM   /85   Pulse 88   Temp 97.9  F (36.6  C) (Tympanic)   Resp 16   Ht 1.6 m (5' 3\")   Wt 97.4 kg (214 lb 12.8 oz)   SpO2 95%   BMI 38.05 kg/m    GEN: NAD  HEENT: PERRL, EOMI, no icterus, injection or pallor. Oropharynx is clear.  LYMPHATICs: no cervical or supraclavicular lymphadenopathy; no other abn lymphadenopathy  PULMONARY: clear with good air entry bilaterally  CARDIOVASCULAR: regular, no murmurs, rubs, or gallops  GASTROINTESTINAL: soft, non-tender, non-distended, normal bowel sounds, no hepatosplenomegaly by percussion or palpation  MUSCULOSKELTAL: warm, well perfused, no edema  NEURO: awake, alert and oriented to time place and person, cranial nerves intact - II - XII, no focal neurologic deficits  SKIN: no rashes     LABORATORY AND IMAGING STUDIES     Recent Labs   Lab Test 03/01/19 08/11/16  0626 08/10/16  0540   POTASSIUM 4.0  --   --    CR 0.88  --  0.61   * 87 106*     No results for input(s): MAG, PHOS in the last 21562 hours.  Recent Labs   Lab Test 06/20/19  1331 08/11/16  0626 08/10/16  0540   WBC 6.8  --   --    HGB 17.1* 13.0 13.3     --   --    MCV 94  --   --    NEUTROPHIL 59.1  --   --      Recent Labs   Lab Test 08/06/19  1427 03/01/19   ALT  --  37   AST  --  30     --      Lab Results   Component Value Date    PATH  09/27/2019     Patient Name: CRYS LOBO  MR#: [id number]  Specimen #: [id number]  Collected: 9/27/2019 12:00  Received: 9/27/2019 17:32  Reported: 10/8/2019 15:07  Ordering Phy(s): LO DIALLO    For improved result formatting, select 'View Enhanced Report Format' under   Linked Documents section.  _________________________________________    TEST(S) REQUESTED:  Myeloproliferative Expanded NGSO    SPECIMEN DESCRIPTION:  Blood    SIGNIFICANT " RESULTS    ---------------------------------------------------------------------  Detected Alterations of Known or Potential Pathogenicity: None    Detected Alterations of Uncertain Significance: None    Genes with No Detected Alterations of the Amino Acid Sequence: CALR, JAK2, MPL  ---------------------------------------------------------------------     INTERPRETATION OF RESULTS     ---------------------------------------------------------------------   No mutations were identified in the analyzed gene regions of JAK2, CALR, or MPL.    Nearly 100% of polycythemia vera (PV) cases have either JAK2 V617F or an exon 12 mutation. As this patient's sample is negative for mutations in these regions, PV is unlikely. JAK2, CALR, or MPL mutations have also been reported in both essential thrombocythemia (ET) and primary myelofibrosis  (PMF) at variable prevalence [1-3].  However, the absence of mutations does not exclude these diagnoses as approximately 10-15% of ET or PMF may be negative for mutations in these 3 genes.    Correlation with clinical information, morphology, and other diagnostic   tests is indicated.    References:  1. [place] SH, [place] E, Chris NL, et al. WHO Classification of Tumours   of Haematopoietic and Lymphoid  Tissues (IARC WHO Classification of Tumours) Revised Edition, IARC Press;   2017.  2. Melissa BAZZI, Mohit RL, Debbie T, et al.  mutations in   myeloproliferative and other myeloid  disorders: a study of 1182 patients. Blood. 2006;108(10):3472-6.  3. Anup T, Solo H, Howard AS, et al. Somatic mutations of   calreticulin in myeloproliferative  neoplasms. N Engl J Med. 2013;369(94):2375-93.   ---------------------------------------------------------------------      CALR (NM_004343.3): 9; JAK2 (NM_004972.3): 12-14,16,20; MPL (NM_005373.2):   10    Less than 500X coverage was achieved in portions of the following exons;   additional mutations in these regions  cannot be  entirely excluded: None    ---------------------------------------------------------------------  Electronic Signature  ---------------------------------------------------------------------  Electronically signed/cosigned by: Monisha Sousa  10/07/2019    Electronically Signed Out By:  Tyson Son M.D., PhD  [employer]    CPT Codes:  A: -TLYWWK, MPNEXPNGSO    TESTING LAB LOCATION:  [address] [address] [phone]    COLLECTION SITE:  Client:  [facility]  Location:  [facility] (R)        Results for CRYS LOBO (MRN [id number]) as of 9/27/2019 12:54   Ref. Range 8/6/2019 14:27 9/27/2019 12:00   Erythropoietin Latest Ref Range: 4 - 27 mU/mL 15    Ferritin Latest Ref Range: 8 - 252 ng/mL 88    Iron Latest Ref Range: 35 - 180 ug/dL 117    Iron Binding Cap Latest Ref Range: 240 - 430 ug/dL 380    Iron Saturation Index Latest Ref Range: 15 - 46 % 31    Lactate Dehydrogenase Latest Ref Range: 81 - 234 U/L 189 202      ASSESSMENT AND PLAN   1. Polycythemia  2. Chronic cigarette smoker  3. Obesity and possibly obstructive sleep apnea    I again extensively reviewed primary and secondary polycythemia which is a condition of increased red cell number and mass. I explained to her that polycythemia vera is due to the presence of a genetic mutation involving JAK2, CALR or MPN genes. She does not have primary polycythemia vera as she does not have either of these mutation on the peripheral blood next generation sequencing.      There a number of reasons that can impair tissue oxygenation and the increased red cell is to meet the demands for increased oxygen including cigarette smoking, obstructive sleep apnea, COPD, asthma, valvular heart disease. Phlebotomy is not indicated in secondary polycythemia as the increased red cell counts is due to an increased need of red cells for oxygen exchange. In this setting we have  to treat underlying condition when possible.      Phuong is chronic smoker and has been smoking for over 40 yrs now. Her polycythemia could be quite likely be related to her smoking and at least partly contributing to it. She is overweight/obese and does realize that she has to loose weight. Her obesity could also be contributing to her polycythemia. She could have obstructive sleep apnea or diastolic dysfunction of heart which can also lead to polycythemia.     We would need additional evaluation for these including lung function tests, echocardiogram and sleep studies. If none of these explain the polycythemia then we might need a bone marrow biopsy.     I have extensively reviewed the procedure for bone marrow biopsy with him. This is performed as an outpatient under sedation and with local anaesethetic. Bone marrow is the soft tissue inside bones that helps form blood cells. It is found in the hollow part of most bones. The procedure involves collection of bone marrow and a small amount of marrow fluid aspirate for analysis with a biopsy/aspirate needle. Only the finished cells are released in the circulation and bone marrow biopsy/aspirate helps assess precursor cells for abnormalities. It helps establish primary hematologic disorders like myelodysplastic/ myeloproliferative disorders, leukemia, lymphoma and multiple myeloma.      I will get all of the above evaluations including the bone marrow biopsy prior to next visit.     Over 45 min of direct face to face time spent with patient with more than 50% time spent in counseling and coordinating care.

## 2019-11-07 DIAGNOSIS — N95.1 MENOPAUSAL SYNDROME (HOT FLASHES): ICD-10-CM

## 2019-11-07 NOTE — TELEPHONE ENCOUNTER
Requested Prescriptions   Pending Prescriptions Disp Refills     gabapentin (NEURONTIN) 600 MG tablet 90 tablet 0     Sig: Take 1 tablet (600 mg) by mouth daily   Last Written Prescription Date:  6/28/19  Last Fill Quantity: 90,  # refills: 0   Last office visit: 6/20/2019 with prescribing provider:  Giovani Vang PA-C   Future Office Visit:   Next 5 appointments (look out 90 days)    Dec 23, 2019  3:30 PM CST  Return Visit with Felipe Mcdaniels MD  [facility] ([facility]) [facility]  [address]  [facility] [address]  [phone]             There is no refill protocol information for this order

## 2019-11-08 ENCOUNTER — ANESTHESIA (OUTPATIENT)
Dept: SURGERY | Facility: CLINIC | Age: 54
End: 2019-11-08
Payer: COMMERCIAL

## 2019-11-08 ENCOUNTER — ANESTHESIA EVENT (OUTPATIENT)
Dept: SURGERY | Facility: CLINIC | Age: 54
End: 2019-11-08
Payer: COMMERCIAL

## 2019-11-08 ENCOUNTER — HOSPITAL ENCOUNTER (OUTPATIENT)
Facility: CLINIC | Age: 54
Discharge: HOME | End: 2019-11-08
Attending: PATHOLOGY | Admitting: PATHOLOGY
Payer: COMMERCIAL

## 2019-11-08 VITALS
DIASTOLIC BLOOD PRESSURE: 85 MMHG | OXYGEN SATURATION: 92 % | BODY MASS INDEX: 37.92 KG/M2 | SYSTOLIC BLOOD PRESSURE: 115 MMHG | WEIGHT: 214 LBS | HEIGHT: 63 IN | RESPIRATION RATE: 12 BRPM | TEMPERATURE: 97.2 F

## 2019-11-08 DIAGNOSIS — D75.1 ERYTHROCYTOSIS: Primary | ICD-10-CM

## 2019-11-08 LAB
BASOPHILS # BLD AUTO: 0 10E9/L (ref 0–0.2)
BASOPHILS NFR BLD AUTO: 0.3 %
CREAT SERPL-MCNC: 0.74 MG/DL (ref 0.52–1.04)
DIFFERENTIAL METHOD BLD: ABNORMAL
EOSINOPHIL # BLD AUTO: 0.2 10E9/L (ref 0–0.7)
EOSINOPHIL NFR BLD AUTO: 3.1 %
ERYTHROCYTE [DISTWIDTH] IN BLOOD BY AUTOMATED COUNT: 13.2 % (ref 10–15)
GFR SERPL CREATININE-BSD FRML MDRD: 89 ML/MIN/{1.73_M2}
HCT VFR BLD AUTO: 50.8 % (ref 35–47)
HGB BLD-MCNC: 16.9 G/DL (ref 11.7–15.7)
IMM GRANULOCYTES # BLD: 0 10E9/L (ref 0–0.4)
IMM GRANULOCYTES NFR BLD: 0.2 %
INTERPRETATION ECG - MUSE: NORMAL
LYMPHOCYTES # BLD AUTO: 1.7 10E9/L (ref 0.8–5.3)
LYMPHOCYTES NFR BLD AUTO: 26.6 %
MCH RBC QN AUTO: 31.8 PG (ref 26.5–33)
MCHC RBC AUTO-ENTMCNC: 33.3 G/DL (ref 31.5–36.5)
MCV RBC AUTO: 96 FL (ref 78–100)
MONOCYTES # BLD AUTO: 0.5 10E9/L (ref 0–1.3)
MONOCYTES NFR BLD AUTO: 7.6 %
NEUTROPHILS # BLD AUTO: 4 10E9/L (ref 1.6–8.3)
NEUTROPHILS NFR BLD AUTO: 62.2 %
NRBC # BLD AUTO: 0 10*3/UL
NRBC BLD AUTO-RTO: 0 /100
PLATELET # BLD AUTO: 212 10E9/L (ref 150–450)
POTASSIUM SERPL-SCNC: 3.8 MMOL/L (ref 3.4–5.3)
RBC # BLD AUTO: 5.32 10E12/L (ref 3.8–5.2)
RETICS # AUTO: 108 10E9/L (ref 25–95)
RETICS/RBC NFR AUTO: 2 % (ref 0.5–2)
WBC # BLD AUTO: 6.4 10E9/L (ref 4–11)

## 2019-11-08 PROCEDURE — 38222 DX BONE MARROW BX & ASPIR: CPT | Performed by: INTERNAL MEDICINE

## 2019-11-08 PROCEDURE — 88311 DECALCIFY TISSUE: CPT | Performed by: INTERNAL MEDICINE

## 2019-11-08 PROCEDURE — 88305 TISSUE EXAM BY PATHOLOGIST: CPT | Performed by: INTERNAL MEDICINE

## 2019-11-08 PROCEDURE — 82565 ASSAY OF CREATININE: CPT | Performed by: PATHOLOGY

## 2019-11-08 PROCEDURE — 88305 TISSUE EXAM BY PATHOLOGIST: CPT | Mod: 26 | Performed by: INTERNAL MEDICINE

## 2019-11-08 PROCEDURE — 25800030 ZZH RX IP 258 OP 636: Performed by: ANESTHESIOLOGY

## 2019-11-08 PROCEDURE — 88280 CHROMOSOME KARYOTYPE STUDY: CPT | Performed by: INTERNAL MEDICINE

## 2019-11-08 PROCEDURE — 71000027 ZZH RECOVERY PHASE 2 EACH 15 MINS: Performed by: PATHOLOGY

## 2019-11-08 PROCEDURE — 25000125 ZZHC RX 250: Performed by: PATHOLOGY

## 2019-11-08 PROCEDURE — 25000125 ZZHC RX 250: Performed by: NURSE ANESTHETIST, CERTIFIED REGISTERED

## 2019-11-08 PROCEDURE — 40000795 ZZHCL STATISTIC DNA PROCESS AND HOLD: Performed by: PATHOLOGY

## 2019-11-08 PROCEDURE — 40000847 ZZHCL STATISTIC MORPHOLOGY W/INTERP HISTOLOGY TC 85060: Performed by: INTERNAL MEDICINE

## 2019-11-08 PROCEDURE — 25000128 H RX IP 250 OP 636: Performed by: NURSE ANESTHETIST, CERTIFIED REGISTERED

## 2019-11-08 PROCEDURE — 40000948 ZZHCL STATISTIC BONE MARROW ASP TC 85097: Performed by: INTERNAL MEDICINE

## 2019-11-08 PROCEDURE — 88237 TISSUE CULTURE BONE MARROW: CPT | Performed by: INTERNAL MEDICINE

## 2019-11-08 PROCEDURE — 88311 DECALCIFY TISSUE: CPT | Mod: 26 | Performed by: INTERNAL MEDICINE

## 2019-11-08 PROCEDURE — 00000058 ZZHCL STATISTIC BONE MARROW ASP PERF TC 38220: Performed by: INTERNAL MEDICINE

## 2019-11-08 PROCEDURE — 85097 BONE MARROW INTERPRETATION: CPT | Performed by: INTERNAL MEDICINE

## 2019-11-08 PROCEDURE — 88264 CHROMOSOME ANALYSIS 20-25: CPT | Performed by: INTERNAL MEDICINE

## 2019-11-08 PROCEDURE — 88185 FLOWCYTOMETRY/TC ADD-ON: CPT | Performed by: INTERNAL MEDICINE

## 2019-11-08 PROCEDURE — 36000050 ZZH SURGERY LEVEL 2 1ST 30 MIN: Performed by: PATHOLOGY

## 2019-11-08 PROCEDURE — 88313 SPECIAL STAINS GROUP 2: CPT | Performed by: INTERNAL MEDICINE

## 2019-11-08 PROCEDURE — 40000424 ZZHCL STATISTIC BONE MARROW CORE PERF TC 38221: Performed by: INTERNAL MEDICINE

## 2019-11-08 PROCEDURE — 27210794 ZZH OR GENERAL SUPPLY STERILE: Performed by: PATHOLOGY

## 2019-11-08 PROCEDURE — 88275 CYTOGENETICS 100-300: CPT | Performed by: INTERNAL MEDICINE

## 2019-11-08 PROCEDURE — 38221 DX BONE MARROW BIOPSIES: CPT | Performed by: INTERNAL MEDICINE

## 2019-11-08 PROCEDURE — 00000159 ZZHCL STATISTIC H-SEND OUTS PREP: Performed by: INTERNAL MEDICINE

## 2019-11-08 PROCEDURE — 88184 FLOWCYTOMETRY/ TC 1 MARKER: CPT | Performed by: INTERNAL MEDICINE

## 2019-11-08 PROCEDURE — 85025 COMPLETE CBC W/AUTO DIFF WBC: CPT | Performed by: PATHOLOGY

## 2019-11-08 PROCEDURE — 85045 AUTOMATED RETICULOCYTE COUNT: CPT | Performed by: PATHOLOGY

## 2019-11-08 PROCEDURE — 88313 SPECIAL STAINS GROUP 2: CPT | Mod: 26 | Performed by: INTERNAL MEDICINE

## 2019-11-08 PROCEDURE — 37000008 ZZH ANESTHESIA TECHNICAL FEE, 1ST 30 MIN: Performed by: PATHOLOGY

## 2019-11-08 PROCEDURE — 00000008 ZZHCL STATISTIC ADDL BM ASP PERF PF 38220: Performed by: INTERNAL MEDICINE

## 2019-11-08 PROCEDURE — 84132 ASSAY OF SERUM POTASSIUM: CPT | Performed by: PATHOLOGY

## 2019-11-08 PROCEDURE — 88271 CYTOGENETICS DNA PROBE: CPT | Performed by: INTERNAL MEDICINE

## 2019-11-08 PROCEDURE — 40000306 ZZH STATISTIC PRE PROC ASSESS II: Performed by: PATHOLOGY

## 2019-11-08 PROCEDURE — 93010 ELECTROCARDIOGRAM REPORT: CPT | Performed by: INTERNAL MEDICINE

## 2019-11-08 PROCEDURE — 36415 COLL VENOUS BLD VENIPUNCTURE: CPT | Performed by: PATHOLOGY

## 2019-11-08 PROCEDURE — 85060 BLOOD SMEAR INTERPRETATION: CPT | Performed by: INTERNAL MEDICINE

## 2019-11-08 PROCEDURE — 40001005 ZZHCL STATISTIC FLOW >15 ABY TC 88189: Performed by: INTERNAL MEDICINE

## 2019-11-08 RX ORDER — FENTANYL CITRATE 50 UG/ML
25-50 INJECTION, SOLUTION INTRAMUSCULAR; INTRAVENOUS
Status: CANCELLED | OUTPATIENT
Start: 2019-11-08

## 2019-11-08 RX ORDER — HYDROMORPHONE HYDROCHLORIDE 1 MG/ML
.3-.5 INJECTION, SOLUTION INTRAMUSCULAR; INTRAVENOUS; SUBCUTANEOUS EVERY 10 MIN PRN
Status: CANCELLED | OUTPATIENT
Start: 2019-11-08

## 2019-11-08 RX ORDER — SODIUM CHLORIDE, SODIUM LACTATE, POTASSIUM CHLORIDE, CALCIUM CHLORIDE 600; 310; 30; 20 MG/100ML; MG/100ML; MG/100ML; MG/100ML
INJECTION, SOLUTION INTRAVENOUS CONTINUOUS
Status: CANCELLED | OUTPATIENT
Start: 2019-11-08

## 2019-11-08 RX ORDER — NALOXONE HYDROCHLORIDE 0.4 MG/ML
.1-.4 INJECTION, SOLUTION INTRAMUSCULAR; INTRAVENOUS; SUBCUTANEOUS
Status: CANCELLED | OUTPATIENT
Start: 2019-11-08 | End: 2019-11-09

## 2019-11-08 RX ORDER — MEPERIDINE HYDROCHLORIDE 25 MG/ML
12.5 INJECTION INTRAMUSCULAR; INTRAVENOUS; SUBCUTANEOUS
Status: CANCELLED | OUTPATIENT
Start: 2019-11-08

## 2019-11-08 RX ORDER — LIDOCAINE 40 MG/G
CREAM TOPICAL
Status: DISCONTINUED | OUTPATIENT
Start: 2019-11-08 | End: 2019-11-08 | Stop reason: HOSPADM

## 2019-11-08 RX ORDER — PROPOFOL 10 MG/ML
INJECTION, EMULSION INTRAVENOUS CONTINUOUS PRN
Status: DISCONTINUED | OUTPATIENT
Start: 2019-11-08 | End: 2019-11-08

## 2019-11-08 RX ORDER — ONDANSETRON 2 MG/ML
4 INJECTION INTRAMUSCULAR; INTRAVENOUS EVERY 30 MIN PRN
Status: CANCELLED | OUTPATIENT
Start: 2019-11-08

## 2019-11-08 RX ORDER — GABAPENTIN 600 MG/1
600 TABLET ORAL DAILY
Qty: 90 TABLET | Refills: 0 | Status: SHIPPED | OUTPATIENT
Start: 2019-11-08 | End: 2020-02-07

## 2019-11-08 RX ORDER — LABETALOL HYDROCHLORIDE 5 MG/ML
10 INJECTION, SOLUTION INTRAVENOUS
Status: CANCELLED | OUTPATIENT
Start: 2019-11-08

## 2019-11-08 RX ORDER — FLUMAZENIL 0.1 MG/ML
0.2 INJECTION, SOLUTION INTRAVENOUS
Status: CANCELLED | OUTPATIENT
Start: 2019-11-08 | End: 2019-11-08

## 2019-11-08 RX ORDER — ACETAMINOPHEN 325 MG/1
975 TABLET ORAL ONCE
Status: CANCELLED | OUTPATIENT
Start: 2019-11-08 | End: 2019-11-08

## 2019-11-08 RX ORDER — GLYCOPYRROLATE 0.2 MG/ML
INJECTION, SOLUTION INTRAMUSCULAR; INTRAVENOUS PRN
Status: DISCONTINUED | OUTPATIENT
Start: 2019-11-08 | End: 2019-11-08

## 2019-11-08 RX ORDER — LIDOCAINE HYDROCHLORIDE 10 MG/ML
8-10 INJECTION, SOLUTION EPIDURAL; INFILTRATION; INTRACAUDAL; PERINEURAL
Status: DISCONTINUED | OUTPATIENT
Start: 2019-11-08 | End: 2019-11-08 | Stop reason: HOSPADM

## 2019-11-08 RX ORDER — ONDANSETRON 4 MG/1
4 TABLET, ORALLY DISINTEGRATING ORAL EVERY 30 MIN PRN
Status: CANCELLED | OUTPATIENT
Start: 2019-11-08

## 2019-11-08 RX ORDER — SODIUM CHLORIDE, SODIUM LACTATE, POTASSIUM CHLORIDE, CALCIUM CHLORIDE 600; 310; 30; 20 MG/100ML; MG/100ML; MG/100ML; MG/100ML
INJECTION, SOLUTION INTRAVENOUS CONTINUOUS
Status: DISCONTINUED | OUTPATIENT
Start: 2019-11-08 | End: 2019-11-08 | Stop reason: HOSPADM

## 2019-11-08 RX ORDER — LIDOCAINE HYDROCHLORIDE 10 MG/ML
INJECTION, SOLUTION EPIDURAL; INFILTRATION; INTRACAUDAL; PERINEURAL PRN
Status: DISCONTINUED | OUTPATIENT
Start: 2019-11-08 | End: 2019-11-08 | Stop reason: HOSPADM

## 2019-11-08 RX ORDER — FENTANYL CITRATE 50 UG/ML
INJECTION, SOLUTION INTRAMUSCULAR; INTRAVENOUS PRN
Status: DISCONTINUED | OUTPATIENT
Start: 2019-11-08 | End: 2019-11-08

## 2019-11-08 RX ADMIN — GLYCOPYRROLATE 0.2 MG: 0.2 INJECTION, SOLUTION INTRAMUSCULAR; INTRAVENOUS at 09:37

## 2019-11-08 RX ADMIN — PROPOFOL 75 MCG/KG/MIN: 10 INJECTION, EMULSION INTRAVENOUS at 09:37

## 2019-11-08 RX ADMIN — SODIUM CHLORIDE, POTASSIUM CHLORIDE, SODIUM LACTATE AND CALCIUM CHLORIDE: 600; 310; 30; 20 INJECTION, SOLUTION INTRAVENOUS at 09:34

## 2019-11-08 RX ADMIN — FENTANYL CITRATE 50 MCG: 50 INJECTION, SOLUTION INTRAMUSCULAR; INTRAVENOUS at 09:37

## 2019-11-08 RX ADMIN — MIDAZOLAM 2 MG: 1 INJECTION INTRAMUSCULAR; INTRAVENOUS at 09:34

## 2019-11-08 ASSESSMENT — MIFFLIN-ST. JEOR: SCORE: 1529.83

## 2019-11-08 NOTE — OP NOTE
PATHOLOGY PROCEDURE NOTE:    PROCEDURE:  Bone Marrow Biopsy and Aspiration  INDICATION:  Erythrocytosis  SITE:   Right posterior superior iliac crest   The patient was identified and the procedure to be performed was reviewed to be correct.  Pause for the Cause was taken. The procedure and the potential risks involved, including, but not limited to pain, bleeding at procedure site, and infection, were discussed with the patient. Post-procedure care instructions were given. An opportunity to ask questions was provided. The patient acknowledged understanding of the procedure and an informed consent was obtained.  Biopsy site was prepped in the usual sterile fashion.  LOCAL ANESTHETIC used:  5 ml of 1% Lidocaine and monitor anesthesia care.  SPECIMEN OBTAINED:  Needle core biopsy: Right posterior iliac crest.  Aspirate fluid:  Right posterior iliac crest..  DRESSING:    Pressure bandage.  COMPLICATIONS:  None. The patient tolerated the procedure well.  POST-PROCEDURE CARE: Patient advised to stay supine for 30 minutes post-procedure  and to keep dressing dry for 24 hours.    PROCEDURE WAS PERFORMED BY: NASIMA HOROWITZ MD 11/8/2019 @ 10:04AM
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
## 2019-11-08 NOTE — ANESTHESIA POSTPROCEDURE EVALUATION
Patient: Phuong New    Procedure(s):  BIOPSY, BONE MARROW with immunophenotyping, cytogenic studies, molecular diagnostics, and other tests as needed    Diagnosis:Elevated hematocrit [R71.8]  Diagnosis Additional Information: No value filed.    Anesthesia Type:  MAC    Note:  Anesthesia Post Evaluation    Patient location during evaluation: PACU  Patient participation: Able to fully participate in evaluation  Level of consciousness: awake and alert  Pain management: adequate  multimodal analgesia used between 6 hours prior to anesthesia start to PACU dischargeAirway patency: patent  Cardiovascular status: acceptable  Respiratory status: acceptable  two or more mitigation strategies used for obstructive sleep apneaHydration status: acceptable  PONV: none     Anesthetic complications: None          Last vitals:  Vitals:    11/08/19 1000 11/08/19 1005 11/08/19 1010   BP: 103/83 109/83 115/85   Resp: 12 12 12   Temp: 97.2  F (36.2  C)     SpO2: 92% 92% 92%         Electronically Signed By: Luis Morris MD  November 8, 2019  10:15 AM

## 2019-11-08 NOTE — ANESTHESIA CARE TRANSFER NOTE
Patient: Phuong New    Procedure(s):  BIOPSY, BONE MARROW with immunophenotyping, cytogenic studies, molecular diagnostics, and other tests as needed    Diagnosis: Elevated hematocrit [R71.8]  Diagnosis Additional Information: No value filed.    Anesthesia Type:   MAC     Note:  Airway :Room Air  Patient transferred to:Phase II  Handoff Report: Identifed the Patient, Identified the Reponsible Provider, Reviewed the pertinent medical history, Discussed the surgical course, Reviewed Intra-OP anesthesia mangement and issues during anesthesia, Set expectations for post-procedure period and Allowed opportunity for questions and acknowledgement of understanding      Vitals: (Last set prior to Anesthesia Care Transfer)    CRNA VITALS  11/8/2019 0926 - 11/8/2019 1001      11/8/2019             Pulse:  95    SpO2:  93 %    EKG:  Sinus rhythm                Electronically Signed By: CLAUDIA Aguilera CRNA  November 8, 2019  10:01 AM

## 2019-11-08 NOTE — TELEPHONE ENCOUNTER
Routing refill request to provider for review/approval because:  Drug not on the [facility] or [facility] refill protocol or controlled substance    Abril B - Registered Nurse  [facility]  Acute and Diagnostic Services

## 2019-11-11 LAB — COPATH REPORT: NORMAL

## 2019-11-12 LAB
COPATH REPORT: NORMAL
COPATH REPORT: NORMAL

## 2019-11-20 ENCOUNTER — OFFICE VISIT (OUTPATIENT)
Dept: URGENT CARE | Facility: URGENT CARE | Age: 54
End: 2019-11-20
Payer: COMMERCIAL

## 2019-11-20 VITALS
HEART RATE: 74 BPM | DIASTOLIC BLOOD PRESSURE: 80 MMHG | OXYGEN SATURATION: 95 % | TEMPERATURE: 98 F | SYSTOLIC BLOOD PRESSURE: 120 MMHG | RESPIRATION RATE: 18 BRPM

## 2019-11-20 DIAGNOSIS — R07.89 CHEST TIGHTNESS: ICD-10-CM

## 2019-11-20 DIAGNOSIS — J01.90 ACUTE SINUSITIS WITH SYMPTOMS > 10 DAYS: Primary | ICD-10-CM

## 2019-11-20 DIAGNOSIS — J20.9 ACUTE BRONCHITIS WITH SYMPTOMS > 10 DAYS: ICD-10-CM

## 2019-11-20 PROCEDURE — 99214 OFFICE O/P EST MOD 30 MIN: CPT | Performed by: FAMILY MEDICINE

## 2019-11-20 RX ORDER — ALBUTEROL SULFATE 0.83 MG/ML
2.5 SOLUTION RESPIRATORY (INHALATION) EVERY 6 HOURS PRN
Qty: 10 VIAL | Refills: 0 | Status: SHIPPED | OUTPATIENT
Start: 2019-11-20

## 2019-11-20 RX ORDER — DOXYCYCLINE 100 MG/1
100 CAPSULE ORAL 2 TIMES DAILY
Qty: 14 CAPSULE | Refills: 0 | Status: SHIPPED | OUTPATIENT
Start: 2019-11-20 | End: 2019-12-16

## 2019-11-20 RX ORDER — PREDNISONE 20 MG/1
20 TABLET ORAL 2 TIMES DAILY
Qty: 10 TABLET | Refills: 0 | Status: SHIPPED | OUTPATIENT
Start: 2019-11-20 | End: 2019-12-16

## 2019-11-20 NOTE — PROGRESS NOTES
Chief Complaint   Patient presents with     Urgent Care     URI     Cough, runny nose, sinus pressure and pain, HA, laryngitis, sweats. Sx x6 days         SUBJECTIVE:   Phuong New is a 54 year old female presenting with a chief complaint of runny nose, stuffy nose, cough - productive, facial pain/pressure and headache. She is an established patient of [facility].she also has been noticing some chest tightness   Onset of symptoms was 6 day(s) ago.  Course of illness is worsening.    Severity moderate  Current and Associated symptoms: chills, cough - productive, facial pain/pressure and headache  Treatment measures tried include OTC Cough med.  Predisposing factors include recent illness .    Past Medical History:   Diagnosis Date     Arthritis     Bilateral arms     Gastroesophageal reflux disease      Hot flashes      Hypercholesteraemia      Hypertension     No cariologist     Obese      Other chronic pain     pelvic area, low back and limbs     Seizures (H)     late 90's. EEG showed nothing, unknown cause     Sleep apnea     Possible slep apnea     Current Outpatient Medications   Medication Sig Dispense Refill     albuterol (PROVENTIL) (2.5 MG/3ML) 0.083% neb solution Take 1 vial (2.5 mg) by nebulization every 6 hours as needed for shortness of breath / dyspnea or wheezing 10 vial 0     aspirin (ASA) 81 MG EC tablet Take 1 tablet (81 mg) by mouth daily 90 tablet 0     atorvastatin (LIPITOR) 40 MG tablet Take 1 tablet (40 mg) by mouth daily 90 tablet 0     chlorthalidone (HYGROTON) 25 MG tablet Take 1 tablet (25 mg) by mouth daily 90 tablet 1     doxycycline hyclate (VIBRAMYCIN) 100 MG capsule Take 1 capsule (100 mg) by mouth 2 times daily for 7 days 14 capsule 0     gabapentin (NEURONTIN) 600 MG tablet Take 1 tablet (600 mg) by mouth daily 90 tablet 0     irbesartan (AVAPRO) 150 MG tablet Take 1 tablet (150 mg) by mouth At Bedtime 90 tablet 0     loratadine (CLARITIN) 10 MG tablet Take 10 mg by mouth daily        omeprazole (PRILOSEC) 20 MG DR capsule TAKE ONE CAPSULE BY MOUTH ONCE DAILY 90 capsule 1     predniSONE (DELTASONE) 20 MG tablet Take 1 tablet (20 mg) by mouth 2 times daily for 5 days 10 tablet 0     albuterol (ALBUTEROL) 108 (90 BASE) MCG/ACT inhaler Inhale 2 puffs into the lungs every 4 hours as needed for shortness of breath / dyspnea (Patient not taking: Reported on 11/20/2019) 1 Inhaler 0     fluticasone (FLONASE) 50 MCG/ACT nasal spray Spray 2 sprays into both nostrils daily (Patient not taking: Reported on 11/20/2019) 16 g 0     Social History     Tobacco Use     Smoking status: Current Every Day Smoker     Packs/day: 1.00     Years: 40.00     Pack years: 40.00     Types: Cigarettes     Smokeless tobacco: Never Used   Substance Use Topics     Alcohol use: Yes     Comment: 2 beers daily 3-4 times weekly     History reviewed. No pertinent family history.      ROS:    10 point ROS of systems including Constitutional, Eyes,Cardiovascular, Gastroenterology, Genitourinary, Integumentary, Muscularskeletal, Psychiatric were all negative except for pertinent positives noted in my HPI         OBJECTIVE:  /80 (BP Location: Right arm, Patient Position: Chair, Cuff Size: Adult Large)   Pulse 74   Temp 98  F (36.7  C) (Tympanic)   Resp 18   SpO2 95%   GENERAL APPEARANCE: healthy, alert and no distress  EYES: EOMI,  PERRL, conjunctiva clear  HENT: ear canals and TM's normal.  Nose and mouth without ulcers, erythema or lesions  NECK: supple, nontender, no lymphadenopathy  RESP: lungs clear to auscultation - no rales, rhonchi or wheezes  CV: regular rates and rhythm, normal S1 S2, no murmur noted  ABDOMEN:  soft, nontender, no HSM or masses and bowel sounds normal  SKIN: no suspicious lesions or rashes  PSYCH: mentation appears normal  Physical Exam      X-Ray was not done.    Medical Decision Making:    Differential Diagnosis:  URI Adult/Peds:  Bronchitis-viral, Influenza, Sinusitis, Viral pharyngitis, Viral  syndrome and Viral upper respiratory illness      ASSESSMENT:  Phuong was seen today for urgent care and uri.    Diagnoses and all orders for this visit:    Acute sinusitis with symptoms > 10 days    Acute bronchitis with symptoms > 10 days  -     doxycycline hyclate (VIBRAMYCIN) 100 MG capsule; Take 1 capsule (100 mg) by mouth 2 times daily for 7 days    Chest tightness  -     albuterol (PROVENTIL) (2.5 MG/3ML) 0.083% neb solution; Take 1 vial (2.5 mg) by nebulization every 6 hours as needed for shortness of breath / dyspnea or wheezing  -     predniSONE (DELTASONE) 20 MG tablet; Take 1 tablet (20 mg) by mouth 2 times daily for 5 days          PLAN:  Tylenol, Fluids, Rest, Saline gargles, Saline nasal spray and Vaporizer  Discussed with pt about symptoms   Treat with doxycycline for 7 days  Also consider doing albuterol neb for the chest tightness and prednisone for the chest tightness too  Patient did understand and agreed with plan I also discussed if notices any worsening symptoms such as high fever chills should follow-up for further evaluation and treatment    Marycarmen Velasquez MD     See orders in Epic

## 2019-11-22 LAB — COPATH REPORT: NORMAL

## 2019-11-23 LAB — COPATH REPORT: NORMAL

## 2019-11-27 ENCOUNTER — ANCILLARY PROCEDURE (OUTPATIENT)
Dept: GENERAL RADIOLOGY | Facility: CLINIC | Age: 54
End: 2019-11-27
Attending: INTERNAL MEDICINE
Payer: COMMERCIAL

## 2019-11-27 ENCOUNTER — OFFICE VISIT (OUTPATIENT)
Dept: INTERNAL MEDICINE | Facility: CLINIC | Age: 54
End: 2019-11-27
Payer: COMMERCIAL

## 2019-11-27 VITALS
RESPIRATION RATE: 16 BRPM | WEIGHT: 212 LBS | DIASTOLIC BLOOD PRESSURE: 70 MMHG | HEIGHT: 63 IN | BODY MASS INDEX: 37.56 KG/M2 | TEMPERATURE: 98.2 F | OXYGEN SATURATION: 94 % | HEART RATE: 91 BPM | SYSTOLIC BLOOD PRESSURE: 120 MMHG

## 2019-11-27 DIAGNOSIS — J06.9 UPPER RESPIRATORY TRACT INFECTION, UNSPECIFIED TYPE: ICD-10-CM

## 2019-11-27 DIAGNOSIS — J06.9 UPPER RESPIRATORY TRACT INFECTION, UNSPECIFIED TYPE: Primary | ICD-10-CM

## 2019-11-27 PROCEDURE — 99213 OFFICE O/P EST LOW 20 MIN: CPT | Performed by: INTERNAL MEDICINE

## 2019-11-27 PROCEDURE — 71046 X-RAY EXAM CHEST 2 VIEWS: CPT

## 2019-11-27 RX ORDER — AZITHROMYCIN 250 MG/1
TABLET, FILM COATED ORAL
Qty: 6 TABLET | Refills: 0 | Status: SHIPPED | OUTPATIENT
Start: 2019-11-27 | End: 2019-12-16

## 2019-11-27 ASSESSMENT — MIFFLIN-ST. JEOR: SCORE: 1520.76

## 2019-11-27 NOTE — PROGRESS NOTES
Subjective     Phuong New is a 54 year old female who presents to clinic today for the following health issues:    HPI   ED/UC Followup:    Facility:    Date of visit: 11/20/19  Reason for visit: URI  Current Status: not much better       HPI:   She has cough with green phlegm, no fever, fatigue. No sore throat ear pain or shortness of breath.     BP Readings from Last 3 Encounters:   11/27/19 120/70   11/20/19 120/80   11/08/19 115/85    Wt Readings from Last 3 Encounters:   11/27/19 96.2 kg (212 lb)   11/08/19 97.1 kg (214 lb)   10/29/19 97.4 kg (214 lb 12.8 oz)                    Reviewed and updated as needed this visit by Provider         Review of Systems   ROS COMP: Constitutional, HEENT, cardiovascular, pulmonary, gi and gu systems are negative, except as otherwise noted.      Objective    There were no vitals taken for this visit.  There is no height or weight on file to calculate BMI.  Physical Exam   GENERAL: healthy, alert and no distress  EYES: Eyes grossly normal to inspection, PERRL and conjunctivae and sclerae normal  HENT: ear canals and TM's normal, nose and mouth without ulcers or lesions  NECK: no adenopathy, no asymmetry, masses, or scars and thyroid normal to palpation  RESP: lungs clear to auscultation - no rales, rhonchi or wheezes  CV: regular rate and rhythm, normal S1 S2, no S3 or S4, no murmur, click or rub, no peripheral edema and peripheral pulses strong  ABDOMEN: soft, nontender, no hepatosplenomegaly, no masses and bowel sounds normal  MS: no gross musculoskeletal defects noted, no edema          Assessment & Plan     1. Upper respiratory tract infection, unspecified type     - XR Chest 2 Views; Future  - azithromycin (ZITHROMAX) 250 MG tablet; Two tablets first day, then one tablet daily for four days.  Dispense: 6 tablet; Refill: 0     Tobacco Cessation:   reports that she has been smoking cigarettes. She has a 40.00 pack-year smoking history. She has never used smokeless  "tobacco.        BMI:   Estimated body mass index is 37.55 kg/m  as calculated from the following:    Height as of this encounter: 1.6 m (5' 3\").    Weight as of this encounter: 96.2 kg (212 lb).             No follow-ups on file.    Gunjan Garcia MD  [facility]    "

## 2019-11-27 NOTE — NURSING NOTE
"/70   Pulse 91   Temp 98.2  F (36.8  C) (Oral)   Resp 16   Ht 1.6 m (5' 3\")   Wt 96.2 kg (212 lb)   SpO2 94%   BMI 37.55 kg/m      "

## 2019-12-16 ENCOUNTER — OFFICE VISIT (OUTPATIENT)
Dept: SLEEP MEDICINE | Facility: CLINIC | Age: 54
End: 2019-12-16
Attending: INTERNAL MEDICINE
Payer: COMMERCIAL

## 2019-12-16 VITALS
HEART RATE: 84 BPM | RESPIRATION RATE: 18 BRPM | HEIGHT: 63 IN | SYSTOLIC BLOOD PRESSURE: 112 MMHG | OXYGEN SATURATION: 96 % | WEIGHT: 215 LBS | DIASTOLIC BLOOD PRESSURE: 78 MMHG | BODY MASS INDEX: 38.09 KG/M2

## 2019-12-16 DIAGNOSIS — R71.8 ELEVATED HEMATOCRIT: ICD-10-CM

## 2019-12-16 DIAGNOSIS — R09.02 HYPOXEMIA: Primary | ICD-10-CM

## 2019-12-16 PROCEDURE — 99204 OFFICE O/P NEW MOD 45 MIN: CPT | Performed by: INTERNAL MEDICINE

## 2019-12-16 ASSESSMENT — MIFFLIN-ST. JEOR: SCORE: 1534.23
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
## 2019-12-16 NOTE — NURSING NOTE
"Chief Complaint   Patient presents with     Consult     Referred by Dr. Mcdaniels        Initial /78   Pulse 84   Resp 18   Ht 1.6 m (5' 2.99\")   Wt 97.5 kg (215 lb)   SpO2 96%   BMI 38.10 kg/m   Estimated body mass index is 38.1 kg/m  as calculated from the following:    Height as of this encounter: 1.6 m (5' 2.99\").    Weight as of this encounter: 97.5 kg (215 lb).    Medication Reconciliation: complete    Neck circumference: 17 inches / 38 centimeters.    ESS 6    Rosemary Zhao MA       "

## 2019-12-16 NOTE — PATIENT INSTRUCTIONS
"MY TREATMENT INFORMATION FOR SLEEP APNEA-  [facility]    DOCTOR : KYLAH HARRELL MD      Am I having a home sleep study?  Watch this video:  [email or web address].[email or web address] [email or web address]/watch?[email or web address]  Please verify your insurance coverage with your insurance carrier    Frequently asked questions:  1. What is Obstructive Sleep Apnea (ALONSO)? ALONSO is the most common type of sleep apnea. Apnea means, \"without breath.\"  Apnea is most often caused by narrowing or collapse of the upper airway as muscles relax during sleep.   Almost everyone has occasional apneas. Most people with sleep apnea have had brief interruptions at night frequently for many years.  The severity of sleep apnea is related to how frequent and severe the events are.   2. What are the consequences of ALONSO? Symptoms include: feeling sleepy during the day, snoring loudly, gasping or stopping of breathing, trouble sleeping, and occasionally morning headaches or heartburn at night.  Sleepiness can be serious and even increase the risk of falling asleep while driving. Other health consequences may include development of high blood pressure and other cardiovascular disease in persons who are susceptible. Untreated ALONSO  can contribute to heart disease, stroke and diabetes.   3. What are the treatment options? In most situations, sleep apnea is a lifelong disease that must be managed with daily therapy. Medications are not effective for sleep apnea and surgery is generally not considered until other therapies have been tried. Your treatment is your choice . Continuous Positive Airway (CPAP) works right away and is the therapy that is effective in nearly everyone. An oral device to hold your jaw forward is usually the next most reliable option. Other options include postioning devices (to keep you off your back), weight loss, and surgery including a tongue pacing device. There is more detail about some of these options below.    Important " tips for using CPAP and similar devices   Know your equipment:  CPAP is continuous positive airway pressure that prevents obstructive sleep apnea by keeping the throat from collapsing while you are sleeping. In most cases, the device is  smart  and can slowly self-adjusts if your throat collapses and keeps a record every day of how well you are treated-this information is available to you and your care team.  BPAP is bilevel positive airway pressure that keeps your throat open and also assists each breath with a pressure boost to maintain adequate breathing.  Special kinds of BPAP are used in patients who have inadequate breathing from lung or heart disease. In most cases, the device is  smart  and can slowly self-adjusts to assist breathing. Like CPAP, the device keeps a record of how well you are treated.  Your mask is your connection to the device. You get to choose what feels most comfortable and the staff will help to make sure if fits. Here: are some examples of the different masks that are available:       Key points to remember on your journey with sleep apnea:  1. Sleep study.  PAP devices often need to be adjusted during a sleep study to show that they are effective and adjusted right.  2. Good tips to remember: Try wearing just the mask during a quiet time during the day so your body adapts to wearing it. A humidifier is recommended for comfort in most cases to prevent drying of your nose and throat. Allergy medication from your provider may help you if you are having nasal congestion.  3. Getting settled-in. It takes more than one night for most of us to get used to wearing a mask. Try wearing just the mask during a quiet time during the day so your body adapts to wearing it. A humidifier is recommended for comfort in most cases. Our team will work with you carefully on the first day and will be in contact within 4 days and again at 2 and 4 weeks for advice and remote device adjustments. Your therapy is  evaluated by the device each day.   4. Use it every night. The more you are able to sleep naturally for 7-8 hours, the more likely you will have good sleep and to prevent health risks or symptoms from sleep apnea. Even if you use it 4 hours it helps. Occasionally all of us are unable to use a medical therapy, in sleep apnea, it is not dangerous to miss one night.   5. Communicate. Call our skilled team on the number provided on the first day if your visit for problems that make it difficult to wear the device. Over 2 out of 3 patients can learn to wear the device long-term with help from our team. Remember to call our team or your sleep providers if you are unable to wear the device as we may have other solutions for those who cannot adapt to mask CPAP therapy. It is recommended that you sleep your sleep provider within the first 3 months and yearly after that if you are not having problems.   6. Use it for your health. We encourage use of CPAP masks during daytime quiet periods to allow your face and brain to adapt to the sensation of CPAP so that it will be a more natural sensation to awaken to at night or during naps. This can be very useful during the first few weeks or months of adapting to CPAP though it does not help medically to wear CPAP during wakefulness and  should not be used as a strategy just to meet guidelines.  7. Take care of your equipment. Make sure you clean your mask and tubing using directions every day and that your filter and mask are replaced as recommended or if they are not working.     BESIDES CPAP, WHAT OTHER THERAPIES ARE THERE?    Positioning Device  Positioning devices are generally used when sleep apnea is mild and only occurs on your back.This example shows a pillow that straps around the waist. It may be appropriate for those whose sleep study shows milder sleep apnea that occurs primarily when lying flat on one's back. Preliminary studies have shown benefit but effectiveness at  home may need to be verified by a home sleep test. These devices are generally not covered by medical insurance.  Examples of devices that maintain sleeping on the back to prevent snoring and mild sleep apnea.    Belt type body positioner  Http://luxustravel.esosa.com/    Electronic reminder  Http://nightshifttherapy.com/  Http://www.NUMBER26.Sputnik8.au/      Oral Appliance  What is oral appliance therapy?  An oral appliance device fits on your teeth at night like a retainer used after having braces. The device is made by a specialized dentist and requires several visits over 1-2 months before a manufactured device is made to fit your teeth and is adjusted to prevent your sleep apnea. Once an oral device is working properly, snoring should be improved. A home sleep test may be recommended at that time if to determine whether the sleep apnea is adequately treated.       Some things to remember:  -Oral devices are often, but not always, covered by your medical insurance. Be sure to check with your insurance provider.   -If you are referred for oral therapy, you will be given a list of specialized dentists to consider or you may choose to visit the Web site of the American Academy of Dental Sleep Medicine  -Oral devices are less likely to work if you have severe sleep apnea or are extremely overweight.     More detailed information  An oral appliance is a small acrylic device that fits over the upper and lower teeth  (similar to a retainer or a mouth guard). This device slightly moves jaw forward, which moves the base of the tongue forward, opens the airway, improves breathing for effective treat snoring and obstructive sleep apnea in perhaps 7 out of 10 people .  The best working devices are custom-made by a dental device  after a mold is made of the teeth 1, 2, 3.  When is an oral appliance indicated?  Oral appliance therapy is recommended as a first-line treatment for patients with primary snoring, mild sleep apnea,  and for patients with moderate sleep apnea who prefer appliance therapy to use of CPAP4, 5. Severity of sleep apnea is determined by sleep testing and is based on the number of respiratory events per hour of sleep.   How successful is oral appliance therapy?  The success rate of oral appliance therapy in patients with mild sleep apnea is 75-80% while in patients with moderate sleep apnea it is 50-70%. The chance of success in patients with severe sleep apnea is 40-50%. The research also shows that oral appliances have a beneficial effect on the cardiovascular health of ALONSO patients at the same magnitude as CPAP therapy7.  Oral appliances should be a second-line treatment in cases of severe sleep apnea, but if not completely successful then a combination therapy utilizing CPAP plus oral appliance therapy may be effective. Oral appliances tend to be effective in a broad range of patients although studies show that the patients who have the highest success are females, younger patients, those with milder disease, and less severe obesity. 3, 6.   Finding a dentist that practices dental sleep medicine  Specific training is available through the American Academy of Dental Sleep Medicine for dentists interested in working in the field of sleep. To find a dentist who is educated in the field of sleep and the use of oral appliances, near you, visit the Web site of the American Academy of Dental Sleep Medicine.    References  1. Jaylin, et al. Objectively measured vs self-reported compliance during oral appliance therapy for sleep-disordered breathing. Chest 2013; 144(5): 2073-2815.  2. Shaheen, et al. Objective measurement of compliance during oral appliance therapy for sleep-disordered breathing. Thorax 2013; 68(1): 91-96.  3. Naveen et al. Mandibular advancement devices in 620 men and women with ALONSO and snoring: tolerability and predictors of treatment success. Chest 2004; 125: 3999-5345.  4. James et al.  Oral appliances for snoring and ALONSO: a review. Sleep 2006; 29: 244-262.  5. Samara et al. Oral appliance treatment for ALONSO: an update. J Clin Sleep Med 2014; 10(2): 215-227.  6. rufina Dewey al. Predictors of OSAH treatment outcome. J Dent Res 2007; 86: 5216-9919.      Weight Loss:    Weight loss is a long-term strategy that may improve sleep apnea in some patients.    Weight management is a personal decision and the decision should be based on your interest and the potential benefits.  If you are interested in exploring weight loss strategies, the following discussion covers the impact on weight loss on sleep apnea and the approaches that may be successful.    Being overweight does not necessarily mean you will have health consequences.  Those who have BMI over 35 or over 27 with existing medical conditions carries greater risk.   Weight loss decreases severity of sleep apnea in most people with obesity. For those with mild obesity who have developed snoring with weight gain, even 15-30 pound weight loss can improve and occasionally eliminate sleep apnea.  Structured and life-long dietary and health habits are necessary to lose weight and keep healthier weight levels.     Though there may be significant health benefits from weight loss, long-term weight loss is very difficult to achieve- studies show success with dietary management in less than 10% of people. In addition, substantial weight loss may require years of dietary control and may be difficult if patients have severe obesity. In these cases, surgical management may be considered.  Finally, older individuals who have tolerated obesity without health complications may be less likely to benefit from weight loss strategies.        Your BMI is Body mass index is 38.1 kg/m .  Weight management is a personal decision.  If you are interested in exploring weight loss strategies, the following discussion covers the approaches that may be successful. Body mass  index (BMI) is one way to tell whether you are at a healthy weight, overweight, or obese. It measures your weight in relation to your height.  A BMI of 18.5 to 24.9 is in the healthy range. A person with a BMI of 25 to 29.9 is considered overweight, and someone with a BMI of 30 or greater is considered obese. More than two-thirds of American adults are considered overweight or obese.  Being overweight or obese increases the risk for further weight gain. Excess weight may lead to heart disease and diabetes.  Creating and following plans for healthy eating and physical activity may help you improve your health.  Weight control is part of healthy lifestyle and includes exercise, emotional health, and healthy eating habits. Careful eating habits lifelong are the mainstay of weight control. Though there are significant health benefits from weight loss, long-term weight loss with diet alone may be very difficult to achieve- studies show long-term success with dietary management in less than 10% of people. Attaining a healthy weight may be especially difficult to achieve in those with severe obesity. In some cases, medications, devices and surgical management might be considered.  What can you do?  If you are overweight or obese and are interested in methods for weight loss, you should discuss this with your provider.     Consider reducing daily calorie intake by 500 calories.     Keep a food journal.     Avoiding skipping meals, consider cutting portions instead.    Diet combined with exercise helps maintain muscle while optimizing fat loss. Strength training is particularly important for building and maintaining muscle mass. Exercise helps reduce stress, increase energy, and improves fitness. Increasing exercise without diet control, however, may not burn enough calories to loose weight.       Start walking three days a week 10-20 minutes at a time    Work towards walking thirty minutes five days a week     Eventually,  increase the speed of your walking for 1-2 minutes at time    In addition, we recommend that you review healthy lifestyles and methods for weight loss available through the National Institutes of Health patient information sites:  http://win.niddk.nih.gov/publications/index.htm    And look into health and wellness programs that may be available through your health insurance provider, employer, local community center, or jannie club.    Weight management plan: Patient was referred to their PCP to discuss a diet and exercise plan.      Surgery:    Surgery for obstructive sleep apnea is considered generally only when other therapies fail to work. Surgery may be discussed with you if you are having a difficult time tolerating CPAP and or when there is an abnormal structure that requires surgical correction.  Nose and throat surgeries often enlarge the airway to prevent collapse.  Most of these surgeries create pain for 1-2 weeks and up to half of the most common surgeries are not effective throughout life.  You should carefully discuss the benefits and drawbacks to surgery with your sleep provider and surgeon to determine if it is the best solution for you.   More information  Surgery for ALONSO is directed at areas that are responsible for narrowing or complete obstruction of the airway during sleep.  There are a wide range of procedures available to enlarge and/or stabilize the airway to prevent blockage of breathing in the three major areas where it can occur: the palate, tongue, and nasal regions.  Successful surgical treatment depends on the accurate identification of the factors responsible for obstructive sleep apnea in each person.  A personalized approach is required because there is no single treatment that works well for everyone.  Because of anatomic variation, consultation with an examination by a sleep surgeon is a critical first step in determining what surgical options are best for each patient.  In some  cases, examination during sedation may be recommended in order to guide the selection of procedures.  Patients will be counseled about risks and benefits as well as the typical recovery course after surgery. Surgery is typically not a cure for a person s ALONSO.  However, surgery will often significantly improve one s ALONSO severity (termed  success rate ).  Even in the absence of a cure, surgery will decrease the cardiovascular risk associated with OSA7; improve overall quality of life8 (sleepiness, functionality, sleep quality, etc).      Palate Procedures:  Patients with ALONSO often have narrowing of their airway in the region of their tonsils and uvula.  The goals of palate procedures are to widen the airway in this region as well as to help the tissues resist collapse.  Modern palate procedure techniques focus on tissue conservation and soft tissue rearrangement, rather than tissue removal.  Often the uvula is preserved in this procedure. Residual sleep apnea is common in patient after pharyngoplasty with an average reduction in sleep apnea events of 33%2.      Tongue Procedures:  ExamWhile patients are awake, the muscles that surround the throat are active and keep this region open for breathing. These muscles relax during sleep, allowing the tongue and other structures to collapse and block breathing.  There are several different tongue procedures available.  Selection of a tongue base procedure depends on characteristics seen on physical exam.  Generally, procedures are aimed at removing bulky tissues in this area or preventing the back of the tongue from falling back during sleep.  Success rates for tongue surgery range from 50-62%3.    Hypoglossal Nerve Stimulation:  Hypoglossal nerve stimulation has recently received approval from the [place] Food and Drug Administration for the treatment of obstructive sleep apnea.  This is based on research showing that the system was safe and effective in treating sleep  apnea6.  Results showed that the median AHI score decreased 68%, from 29.3 to 9.0. This therapy uses an implant system that senses breathing patterns and delivers mild stimulation to airway muscles, which keeps the airway open during sleep.  The system consists of three fully implanted components: a small generator (similar in size to a pacemaker), a breathing sensor, and a stimulation lead.  Using a small handheld remote, a patient turns the therapy on before bed and off upon awakening.    Candidates for this device must be greater than 22 years of age, have moderate to severe ALONSO (AHI between 20-65), BMI less than 32, have tried CPAP/oral appliance without success, and have appropriate upper airway anatomy (determined by a sleep endoscopy performed by Dr. Aiken).    Hypoglossal Nerve Stimulation Pathway:    The sleep surgeon s office will work with the patient through the insurance prior-authorization process (including communications and appeals).    Nasal Procedures:  Nasal obstruction can interfere with nasal breathing during the day and night.  Studies have shown that relief of nasal obstruction can improve the ability of some patients to tolerate positive airway pressure therapy for obstructive sleep apnea1.  Treatment options include medications such as nasal saline, topical corticosteroid and antihistamine sprays, and oral medications such as antihistamines or decongestants. Non-surgical treatments can include external nasal dilators for selected patients. If these are not successful by themselves, surgery can improve the nasal airway either alone or in combination with these other options.      Combination Procedures:  Combination of surgical procedures and other treatments may be recommended, particularly if patients have more than one area of narrowing or persistent positional disease.  The success rate of combination surgery ranges from 66-80%2,3.    References  1. Ria ROWLAND. The Role of the Nose in  Snoring and Obstructive Sleep Apnoea: An Update.  Eur Arch Otorhinolaryngol. 2011; 268: 1365-73.  2.  Hedy SM; Arabella JA; Brendon JR; Pallanch JF; Sue MB; Iman SG; Arianne MENDOZA. Surgical modifications of the upper airway for obstructive sleep apnea in adults: a systematic review and meta-analysis. SLEEP 2010;33(10):5750-4306. Jesse VERONICA. Hypopharyngeal surgery in obstructive sleep apnea: an evidence-based medicine review.  Arch Otolaryngol Head Neck Surg. 2006 Feb;132(2):206-13.  3. Dandre YH1, Parish Y, Chase JORDNA. The efficacy of anatomically based multilevel surgery for obstructive sleep apnea. Otolaryngol Head Neck Surg. 2003 Oct;129(4):327-35.  4. Kezirian E, Goldberg A. Hypopharyngeal Surgery in Obstructive Sleep Apnea: An Evidence-Based Medicine Review. Arch Otolaryngol Head Neck Surg. 2006 Feb;132(2):206-13.  5. Danielle WALLACE et al. Upper-Airway Stimulation for Obstructive Sleep Apnea.  N Engl J Med. 2014 Jan 9;370(2):139-49.  6. Sebastien Y et al. Increased Incidence of Cardiovascular Disease in Middle-aged Men with Obstructive Sleep Apnea. Am J Respir Crit Care Med; 2002 166: 159-165  7. Clark EM et al. Studying Life Effects and Effectiveness of Palatopharyngoplasty (SLEEP) study: Subjective Outcomes of Isolated Uvulopalatopharyngoplasty. Otolaryngol Head Neck Surg. 2011; 144: 623-631.            Your BMI is Body mass index is 38.1 kg/m .  Weight management is a personal decision.  If you are interested in exploring weight loss strategies, the following discussion covers the approaches that may be successful. Body mass index (BMI) is one way to tell whether you are at a healthy weight, overweight, or obese. It measures your weight in relation to your height.  A BMI of 18.5 to 24.9 is in the healthy range. A person with a BMI of 25 to 29.9 is considered overweight, and someone with a BMI of 30 or greater is considered obese. More than two-thirds of American adults are considered overweight or obese.  Being  overweight or obese increases the risk for further weight gain. Excess weight may lead to heart disease and diabetes.  Creating and following plans for healthy eating and physical activity may help you improve your health.  Weight control is part of healthy lifestyle and includes exercise, emotional health, and healthy eating habits. Careful eating habits lifelong are the mainstay of weight control. Though there are significant health benefits from weight loss, long-term weight loss with diet alone may be very difficult to achieve- studies show long-term success with dietary management in less than 10% of people. Attaining a healthy weight may be especially difficult to achieve in those with severe obesity. In some cases, medications, devices and surgical management might be considered.  What can you do?  If you are overweight or obese and are interested in methods for weight loss, you should discuss this with your provider.     Consider reducing daily calorie intake by 500 calories.     Keep a food journal.     Avoiding skipping meals, consider cutting portions instead.    Diet combined with exercise helps maintain muscle while optimizing fat loss. Strength training is particularly important for building and maintaining muscle mass. Exercise helps reduce stress, increase energy, and improves fitness. Increasing exercise without diet control, however, may not burn enough calories to loose weight.       Start walking three days a week 10-20 minutes at a time    Work towards walking thirty minutes five days a week     Eventually, increase the speed of your walking for 1-2 minutes at time    In addition, we recommend that you review healthy lifestyles and methods for weight loss available through the National Institutes of Health patient information sites:  http://win.niddk.nih.gov/publications/index.htm    And look into health and wellness programs that may be available through your health insurance provider,  employer, local community center, or jannie club.    Weight management plan: Patient was referred to their PCP to discuss a diet and exercise plan.

## 2019-12-19 ENCOUNTER — HOSPITAL ENCOUNTER (OUTPATIENT)
Dept: RESPIRATORY THERAPY | Facility: CLINIC | Age: 54
Discharge: HOME | End: 2019-12-19
Attending: INTERNAL MEDICINE | Admitting: INTERNAL MEDICINE
Payer: COMMERCIAL

## 2019-12-19 ENCOUNTER — HOSPITAL ENCOUNTER (OUTPATIENT)
Dept: RESPIRATORY THERAPY | Facility: CLINIC | Age: 54
End: 2019-12-19
Attending: INTERNAL MEDICINE
Payer: COMMERCIAL

## 2019-12-19 ENCOUNTER — HOSPITAL ENCOUNTER (OUTPATIENT)
Dept: CARDIOLOGY | Facility: CLINIC | Age: 54
End: 2019-12-19
Attending: INTERNAL MEDICINE
Payer: COMMERCIAL

## 2019-12-19 DIAGNOSIS — R71.8 ELEVATED HEMATOCRIT: ICD-10-CM

## 2019-12-19 LAB
BASE EXCESS BLDA CALC-SCNC: 4.7 MMOL/L
DLCOCOR-%PRED-PRE: 103 %
DLCOCOR-PRE: 20.7 ML/MIN/MMHG
DLCOUNC-%PRED-PRE: 112 %
DLCOUNC-PRE: 22.63 ML/MIN/MMHG
DLCOUNC-PRED: 20.03 ML/MIN/MMHG
ERV-%PRED-PRE: 53 %
ERV-PRE: 0.21 L
ERV-PRED: 0.4 L
EXPTIME-PRE: 5.81 SEC
FEF2575-%PRED-PRE: 72 %
FEF2575-PRE: 1.76 L/SEC
FEF2575-PRED: 2.41 L/SEC
FEFMAX-%PRED-PRE: 75 %
FEFMAX-PRE: 4.85 L/SEC
FEFMAX-PRED: 6.38 L/SEC
FEV1-%PRED-PRE: 80 %
FEV1-PRE: 2.05 L
FEV1FEV6-PRE: 77 %
FEV1FEV6-PRED: 81 %
FEV1FVC-PRE: 77 %
FEV1FVC-PRED: 80 %
FEV1SVC-PRE: 79 %
FEV1SVC-PRED: 78 %
FIFMAX-PRE: 2.99 L/SEC
FRCPLETH-%PRED-PRE: 99 %
FRCPLETH-PRE: 2.64 L
FRCPLETH-PRED: 2.65 L
FVC-%PRED-PRE: 82 %
FVC-PRE: 2.64 L
FVC-PRED: 3.2 L
GAW-%PRED-PRE: 46 %
GAW-PRE: 0.48 L/S/CMH2O
GAW-PRED: 1.03 L/S/CMH2O
HCO3 BLD-SCNC: 30 MMOL/L (ref 21–28)
IC-%PRED-PRE: 83 %
IC-PRE: 2.39 L
IC-PRED: 2.86 L
O2/TOTAL GAS SETTING VFR VENT: ABNORMAL %
PCO2 BLD: 45 MM HG (ref 35–45)
PH BLD: 7.43 PH (ref 7.35–7.45)
PO2 BLD: 59 MM HG (ref 80–105)
RVPLETH-%PRED-PRE: 134 %
RVPLETH-PRE: 2.43 L
RVPLETH-PRED: 1.8 L
SGAW-%PRED-PRE: 144 %
SGAW-PRE: 0.15 1/CMH2O*S
SGAW-PRED: 0.1 1/CMH2O*S
SRAW-%PRED-PRE: 143 %
SRAW-PRE: 6.83 CMH2O*S
SRAW-PRED: 4.76 CMH2O*S
TLCPLETH-%PRED-PRE: 104 %
TLCPLETH-PRE: 5.04 L
TLCPLETH-PRED: 4.81 L
VA-%PRED-PRE: 90 %
VA-PRE: 4.23 L
VC-%PRED-PRE: 79 %
VC-PRE: 2.61 L
VC-PRED: 3.26 L

## 2019-12-19 PROCEDURE — 40000275 ZZH STATISTIC RCP TIME EA 10 MIN

## 2019-12-19 PROCEDURE — 94010 BREATHING CAPACITY TEST: CPT

## 2019-12-19 PROCEDURE — 36600 WITHDRAWAL OF ARTERIAL BLOOD: CPT

## 2019-12-19 PROCEDURE — 25800025 ZZH RX 258: Performed by: INTERNAL MEDICINE

## 2019-12-19 PROCEDURE — 82803 BLOOD GASES ANY COMBINATION: CPT | Performed by: INTERNAL MEDICINE

## 2019-12-19 PROCEDURE — 93306 TTE W/DOPPLER COMPLETE: CPT | Mod: 26 | Performed by: INTERNAL MEDICINE

## 2019-12-19 PROCEDURE — 94726 PLETHYSMOGRAPHY LUNG VOLUMES: CPT

## 2019-12-19 PROCEDURE — 25500064 ZZH RX 255 OP 636: Performed by: INTERNAL MEDICINE

## 2019-12-19 PROCEDURE — 40000264 ECHOCARDIOGRAM COMPLETE

## 2019-12-19 PROCEDURE — 94729 DIFFUSING CAPACITY: CPT

## 2019-12-19 RX ORDER — ACYCLOVIR 200 MG/1
30 CAPSULE ORAL ONCE
Status: COMPLETED | OUTPATIENT
Start: 2019-12-19 | End: 2019-12-19

## 2019-12-19 RX ADMIN — SODIUM CHLORIDE 30 ML: 9 INJECTION, SOLUTION INTRAMUSCULAR; INTRAVENOUS; SUBCUTANEOUS at 11:33

## 2019-12-19 RX ADMIN — HUMAN ALBUMIN MICROSPHERES AND PERFLUTREN 3 ML: 10; .22 INJECTION, SOLUTION INTRAVENOUS at 11:33

## 2019-12-19 NOTE — PROGRESS NOTES
PFT Note: Patient completed pulmonary function testing with spirometry, lung volumes and diffusion. Good patient effort and cooperation. The results of this test met the ATS standards for acceptability and repeatability. Hgb result of 16.9 used for Diffusion. Room Air sats=95%, HR 80. ABG done right radial and pressure held 5 min. With no complications. Sample sent to lab.

## 2019-12-20 NOTE — PROCEDURES
Procedure Date: 2019      Please see medical chart for graphs and statistics related to this report.       REFERRING PHYSICIAN:   Felipe Mcdaniels                                        TECHNICIAN:   Sadie Barbosa      DIAGNOSIS:   Bone Marrow Biopsy, Hypoxemia                                                                 HEIGHT:   63.25 inches                                                                    WEIGHT:   214.00 Lbs.      DYSPNEA:   After severe exertion                                                                COUGH:   Productive   WHEEZE:   Frequent                                                                      TOBACCO PROD:   Cigarette   YEARS SMOKED:   40.0                                                    PKS/DAY:   1.0   YEARS QUIT:                                                                  MEDICATIONS:   Albuterol mdi & Neb prn        POST-TEST COMMENTS:   Good patient effort and cooperation.  The results of testing meet ATS criteria for acceptability and repeatability.  Hgb of 16.9 used from 2019 for diffusion.  Room air Sats=95%, HR 80       INTERPRETATION:       1.  Normal spirometry   2.  Normal lung volumes   3.  Normal diffusion   4.  Normal PFTs         RUBEN ALANIZ MD             D: 2019   T: 2019   MT: ALHAJI      Name:     CRYS LOBO   MRN:      [id number]        Account:        [id number]   :      1965           Procedure Date: 2019      Document: [id number]       cc: Giovani Mcdaniels MD

## 2019-12-23 ENCOUNTER — ONCOLOGY VISIT (OUTPATIENT)
Dept: ONCOLOGY | Facility: CLINIC | Age: 54
End: 2019-12-23
Attending: INTERNAL MEDICINE
Payer: COMMERCIAL

## 2019-12-23 VITALS
HEART RATE: 89 BPM | OXYGEN SATURATION: 94 % | TEMPERATURE: 98.8 F | DIASTOLIC BLOOD PRESSURE: 74 MMHG | SYSTOLIC BLOOD PRESSURE: 106 MMHG | RESPIRATION RATE: 16 BRPM | BODY MASS INDEX: 38.63 KG/M2 | WEIGHT: 218 LBS

## 2019-12-23 DIAGNOSIS — E83.110 HEREDITARY HEMOCHROMATOSIS (H): ICD-10-CM

## 2019-12-23 DIAGNOSIS — R71.8 ELEVATED HEMATOCRIT: Primary | ICD-10-CM

## 2019-12-23 PROCEDURE — G0463 HOSPITAL OUTPT CLINIC VISIT: HCPCS

## 2019-12-23 PROCEDURE — 99214 OFFICE O/P EST MOD 30 MIN: CPT | Performed by: INTERNAL MEDICINE

## 2019-12-23 RX ORDER — CLINDAMYCIN HCL 150 MG
CAPSULE ORAL 4 TIMES DAILY
COMMUNITY
Start: 2019-12-18 | End: 2020-03-06

## 2019-12-23 ASSESSMENT — PAIN SCALES - GENERAL: PAINLEVEL: NO PAIN (0)

## 2019-12-23 NOTE — PROGRESS NOTES
[facility]  HEMATOLOGY AND ONCOLOGY    FOLLOW-UP VISIT NOTE    PATIENT NAME: Phuong New MRN # [id number]  DATE OF VISIT: Dec 23, 2019 YOB: 1965    REFERRING PROVIDER: Giovani Vang PA-C  [address] [place] [address]      SUBJECTIVE   Phuong New is being followed for polycythemia    Phuong is accompanied by her  at this clinic visit.  She denies any new complains. She is being seen with follow up labs.       PAST MEDICAL HISTORY     Past Medical History:   Diagnosis Date     Arthritis     Bilateral arms     Gastroesophageal reflux disease      Hot flashes      Hypercholesteraemia      Hypertension     No cariologist     Obese      Other chronic pain     pelvic area, low back and limbs     Seizures (H)     late 90's. EEG showed nothing, unknown cause     Sleep apnea     Possible slep apnea         CURRENT OUTPATIENT MEDICATIONS     Current Outpatient Medications   Medication Sig     albuterol (ALBUTEROL) 108 (90 BASE) MCG/ACT inhaler Inhale 2 puffs into the lungs every 4 hours as needed for shortness of breath / dyspnea     albuterol (PROVENTIL) (2.5 MG/3ML) 0.083% neb solution Take 1 vial (2.5 mg) by nebulization every 6 hours as needed for shortness of breath / dyspnea or wheezing     aspirin (ASA) 81 MG EC tablet Take 1 tablet (81 mg) by mouth daily     atorvastatin (LIPITOR) 40 MG tablet Take 1 tablet (40 mg) by mouth daily     chlorthalidone (HYGROTON) 25 MG tablet Take 1 tablet (25 mg) by mouth daily     clindamycin (CLEOCIN) 150 MG capsule 4 times daily     fluticasone (FLONASE) 50 MCG/ACT nasal spray Spray 2 sprays into both nostrils daily (Patient taking differently: Spray 2 sprays into both nostrils as needed )     gabapentin (NEURONTIN) 600 MG tablet Take 1 tablet (600 mg) by mouth daily     irbesartan (AVAPRO) 150 MG tablet Take 1 tablet (150 mg) by mouth At Bedtime     loratadine (CLARITIN) 10 MG tablet Take 10 mg by mouth daily     omeprazole  (PRILOSEC) 20 MG DR capsule TAKE ONE CAPSULE BY MOUTH ONCE DAILY     No current facility-administered medications for this visit.         ALLERGIES      Allergies   Allergen Reactions     Lisinopril Cough        REVIEW OF SYSTEMS   As above in the HPI, o/w complete 12-point ROS was negative.     PHYSICAL EXAM   /74   Pulse 89   Temp 98.8  F (37.1  C) (Oral)   Resp 16   Wt 98.9 kg (218 lb)   SpO2 94%   BMI 38.63 kg/m    GEN: NAD  HEENT: PERRL, EOMI, no icterus, injection or pallor. Oropharynx is clear.  LYMPHATICs: no cervical or supraclavicular lymphadenopathy; no other abn lymphadenopathy  PULMONARY: clear with good air entry bilaterally  CARDIOVASCULAR: regular, no murmurs, rubs, or gallops  GASTROINTESTINAL: soft, non-tender, non-distended, normal bowel sounds, no hepatosplenomegaly by percussion or palpation  MUSCULOSKELTAL: warm, well perfused, no edema  NEURO: awake, alert and oriented to time place and person, cranial nerves intact - II - XII, no focal neurologic deficits  SKIN: no rashes     LABORATORY AND IMAGING STUDIES     Recent Labs   Lab Test 03/01/19 08/11/16  0626 08/10/16  0540   POTASSIUM 4.0  --   --    CR 0.88  --  0.61   * 87 106*     No results for input(s): MAG, PHOS in the last 10791 hours.  Recent Labs   Lab Test 06/20/19  1331 08/11/16  0626 08/10/16  0540   WBC 6.8  --   --    HGB 17.1* 13.0 13.3     --   --    MCV 94  --   --    NEUTROPHIL 59.1  --   --      Recent Labs   Lab Test 08/06/19  1427 03/01/19   ALT  --  37   AST  --  30     --      Lab Results   Component Value Date    PATH  09/27/2019     Patient Name: CRYS LOBO  MR#: [id number]  Specimen #: [id number]  Collected: 9/27/2019 12:00  Received: 9/27/2019 17:32  Reported: 10/8/2019 15:07  Ordering Phy(s): LO DIALLO    For improved result formatting, select 'View Enhanced Report Format' under   Linked Documents section.  _________________________________________    TEST(S)  REQUESTED:  Myeloproliferative Expanded NGSO    SPECIMEN DESCRIPTION:  Blood    SIGNIFICANT RESULTS    ---------------------------------------------------------------------  Detected Alterations of Known or Potential Pathogenicity: None    Detected Alterations of Uncertain Significance: None    Genes with No Detected Alterations of the Amino Acid Sequence: CALR, JAK2, MPL  ---------------------------------------------------------------------     INTERPRETATION OF RESULTS     ---------------------------------------------------------------------   No mutations were identified in the analyzed gene regions of JAK2, CALR, or MPL.    Nearly 100% of polycythemia vera (PV) cases have either JAK2 V617F or an exon 12 mutation. As this patient's sample is negative for mutations in these regions, PV is unlikely. JAK2, CALR, or MPL mutations have also been reported in both essential thrombocythemia (ET) and primary myelofibrosis  (PMF) at variable prevalence [1-3].  However, the absence of mutations does not exclude these diagnoses as approximately 10-15% of ET or PMF may be negative for mutations in these 3 genes.    Correlation with clinical information, morphology, and other diagnostic   tests is indicated.    References:  1. Ariadna SH, [place] E, Perez NL, et al. WHO Classification of Tumours   of Haematopoietic and Lymphoid  Tissues (IARC WHO Classification of Tumours) Revised Edition, IARC Press;   2017.  2. Melissa BAZZI, Mohit RL, Debbie T, et al.  mutations in   myeloproliferative and other myeloid  disorders: a study of 1182 patients. Blood. 2006;108(10):3472-6.  3. Anup T, Solo H, Howard AS, et al. Somatic mutations of   calreticulin in myeloproliferative  neoplasms. N Engl J Med. 2013;369(46):237-91.   ---------------------------------------------------------------------      CALR (NM_004343.3): 9; JAK2 (NM_004972.3): 12-14,16,20; MPL (NM_005373.2):   10    Less than 500X coverage was achieved  in portions of the following exons;   additional mutations in these regions  cannot be entirely excluded: None    ---------------------------------------------------------------------  Electronic Signature  ---------------------------------------------------------------------  Electronically signed/cosigned by: Monisha Sousa  10/07/2019    Electronically Signed Out By:  Tyson Son M.D., PhD  [employer]    CPT Codes:  A: -ULZNGU, MPNEXPNGSO    TESTING LAB LOCATION:  [address] [id number] [phone]    COLLECTION SITE:  Client:  [facility]  Location:  [facility] (R)        Results for CRYS LOBO (MRN [id number]) as of 9/27/2019 12:54   Ref. Range 8/6/2019 14:27 9/27/2019 12:00   Erythropoietin Latest Ref Range: 4 - 27 mU/mL 15    Ferritin Latest Ref Range: 8 - 252 ng/mL 88    Iron Latest Ref Range: 35 - 180 ug/dL 117    Iron Binding Cap Latest Ref Range: 240 - 430 ug/dL 380    Iron Saturation Index Latest Ref Range: 15 - 46 % 31    Lactate Dehydrogenase Latest Ref Range: 81 - 234 U/L 189 202      ASSESSMENT AND PLAN   1. Polycythemia (negative for JAK2, CALR, MPN mutations and unremarkable marrow except for increased cellularity)  2. Chronic cigarette smoker  3. Obesity and possibly obstructive sleep apnea    I again extensively reviewed primary and secondary polycythemia which is a condition of increased red cell number and mass. I explained to her that polycythemia vera is due to the presence of a genetic mutation involving JAK2, CALR or MPN genes. She does not have primary polycythemia vera as she does not have either of these mutation on the peripheral blood next generation sequencing. I did get bone marrow biopsy done for her. She does not have any abnormality except for mildly increase cellularity as expected without any maturation defect or dysplasia. There are no concerning findings on her bone  marrow biopsy.     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 I again reviewed causes for this. I did get cardiac echocardiogram with bubble studies. She does not have arterial or venous shunts. Her PFT are essentially normal.     She does smoke and also is obese with concerns for obstructive sleep apnea. Both of these are quite likely the cause for her elevated hematocrit. There is nothing that needs to be done other than loose weight and stop smoking. She is working on this and notes that she plans to quite before new year.     I would like to see her in 6 months with labs prior to clinic visit - CBC, CMP    Over 25 min of direct face to face time spent with patient with more than 50% time spent in counseling and coordinating care.

## 2019-12-23 NOTE — NURSING NOTE
"Oncology Rooming Note    December 23, 2019 3:54 PM   Phuong New is a 54 year old female who presents for:    Chief Complaint   Patient presents with     Oncology Clinic Visit     elevated hematocrrit     Initial Vitals: /74   Pulse 89   Temp 98.8  F (37.1  C) (Oral)   Resp 16   Wt 98.9 kg (218 lb)   SpO2 94%   BMI 38.63 kg/m   Estimated body mass index is 38.63 kg/m  as calculated from the following:    Height as of 12/16/19: 1.6 m (5' 2.99\").    Weight as of this encounter: 98.9 kg (218 lb). Body surface area is 2.1 meters squared.  No Pain (0) Comment: Data Unavailable   No LMP recorded. Patient has had a hysterectomy.  Allergies reviewed: Yes  Medications reviewed: Yes    Medications: Medication refills not needed today.  Pharmacy name entered into [employer]: [facility] [address] [place] [address] - [id number]    Clinical concerns: f/u       Gris Harris CMA              "

## 2019-12-23 NOTE — LETTER
12/23/2019         RE: Phuong New  [address] [address] [facility] [address]        Dear Colleague,    Thank you for referring your patient, Phuong New, to the [facility]. Please see a copy of my visit note below.    [facility]  HEMATOLOGY AND ONCOLOGY    FOLLOW-UP VISIT NOTE    PATIENT NAME: Phuong New MRN # [id number]  DATE OF VISIT: Dec 23, 2019 YOB: 1965    REFERRING PROVIDER: Giovani Vang PA-C  [address] [place] [address]      SUBJECTIVE   Phuong New is being followed for polycythemia    Phuong is accompanied by her  at this clinic visit.  She denies any new complains. She is being seen with follow up labs.       PAST MEDICAL HISTORY     Past Medical History:   Diagnosis Date     Arthritis     Bilateral arms     Gastroesophageal reflux disease      Hot flashes      Hypercholesteraemia      Hypertension     No cariologist     Obese      Other chronic pain     pelvic area, low back and limbs     Seizures (H)     late 90's. EEG showed nothing, unknown cause     Sleep apnea     Possible slep apnea         CURRENT OUTPATIENT MEDICATIONS     Current Outpatient Medications   Medication Sig     albuterol (ALBUTEROL) 108 (90 BASE) MCG/ACT inhaler Inhale 2 puffs into the lungs every 4 hours as needed for shortness of breath / dyspnea     albuterol (PROVENTIL) (2.5 MG/3ML) 0.083% neb solution Take 1 vial (2.5 mg) by nebulization every 6 hours as needed for shortness of breath / dyspnea or wheezing     aspirin (ASA) 81 MG EC tablet Take 1 tablet (81 mg) by mouth daily     atorvastatin (LIPITOR) 40 MG tablet Take 1 tablet (40 mg) by mouth daily     chlorthalidone (HYGROTON) 25 MG tablet Take 1 tablet (25 mg) by mouth daily     clindamycin (CLEOCIN) 150 MG capsule 4 times daily     fluticasone (FLONASE) 50 MCG/ACT nasal spray Spray 2 sprays into both nostrils daily (Patient taking differently: Spray 2 sprays into both nostrils as needed )      gabapentin (NEURONTIN) 600 MG tablet Take 1 tablet (600 mg) by mouth daily     irbesartan (AVAPRO) 150 MG tablet Take 1 tablet (150 mg) by mouth At Bedtime     loratadine (CLARITIN) 10 MG tablet Take 10 mg by mouth daily     omeprazole (PRILOSEC) 20 MG DR capsule TAKE ONE CAPSULE BY MOUTH ONCE DAILY     No current facility-administered medications for this visit.         ALLERGIES      Allergies   Allergen Reactions     Lisinopril Cough        REVIEW OF SYSTEMS   As above in the HPI, o/w complete 12-point ROS was negative.     PHYSICAL EXAM   /74   Pulse 89   Temp 98.8  F (37.1  C) (Oral)   Resp 16   Wt 98.9 kg (218 lb)   SpO2 94%   BMI 38.63 kg/m     GEN: NAD  HEENT: PERRL, EOMI, no icterus, injection or pallor. Oropharynx is clear.  LYMPHATICs: no cervical or supraclavicular lymphadenopathy; no other abn lymphadenopathy  PULMONARY: clear with good air entry bilaterally  CARDIOVASCULAR: regular, no murmurs, rubs, or gallops  GASTROINTESTINAL: soft, non-tender, non-distended, normal bowel sounds, no hepatosplenomegaly by percussion or palpation  MUSCULOSKELTAL: warm, well perfused, no edema  NEURO: awake, alert and oriented to time place and person, cranial nerves intact - II - XII, no focal neurologic deficits  SKIN: no rashes     LABORATORY AND IMAGING STUDIES     Recent Labs   Lab Test 03/01/19 08/11/16  0626 08/10/16  0540   POTASSIUM 4.0  --   --    CR 0.88  --  0.61   * 87 106*     No results for input(s): MAG, PHOS in the last 85107 hours.  Recent Labs   Lab Test 06/20/19  1331 08/11/16  0626 08/10/16  0540   WBC 6.8  --   --    HGB 17.1* 13.0 13.3     --   --    MCV 94  --   --    NEUTROPHIL 59.1  --   --      Recent Labs   Lab Test 08/06/19  1427 03/01/19   ALT  --  37   AST  --  30     --      Lab Results   Component Value Date    PATH  09/27/2019     Patient Name: CRYS LOBO  MR#: [id number]  Specimen #: [id number]  Collected: 9/27/2019 12:00  Received: 9/27/2019  17:32  Reported: 10/8/2019 15:07  Ordering Phy(s): LO DIALLO    For improved result formatting, select 'View Enhanced Report Format' under   Linked Documents section.  _________________________________________    TEST(S) REQUESTED:  Myeloproliferative Expanded NGSO    SPECIMEN DESCRIPTION:  Blood    SIGNIFICANT RESULTS    ---------------------------------------------------------------------  Detected Alterations of Known or Potential Pathogenicity: None    Detected Alterations of Uncertain Significance: None    Genes with No Detected Alterations of the Amino Acid Sequence: CALR, JAK2, MPL  ---------------------------------------------------------------------     INTERPRETATION OF RESULTS     ---------------------------------------------------------------------   No mutations were identified in the analyzed gene regions of JAK2, CALR, or MPL.    Nearly 100% of polycythemia vera (PV) cases have either JAK2 V617F or an exon 12 mutation. As this patient's sample is negative for mutations in these regions, PV is unlikely. JAK2, CALR, or MPL mutations have also been reported in both essential thrombocythemia (ET) and primary myelofibrosis  (PMF) at variable prevalence [1-3].  However, the absence of mutations does not exclude these diagnoses as approximately 10-15% of ET or PMF may be negative for mutations in these 3 genes.    Correlation with clinical information, morphology, and other diagnostic   tests is indicated.    References:  1. Ariadna SH, Anand E, Perez NL, et al. WHO Classification of Tumours   of Haematopoietic and Lymphoid  Tissues (IARC WHO Classification of Tumours) Revised Edition, IARC Press;   2017.  2. Melissa BAZZI, Mohit RL, Debbie T, et al.  mutations in   myeloproliferative and other myeloid  disorders: a study of 1182 patients. Blood. 2006;108(10):3472-6.  3. Anup T, Solo H, Howard AS, et al. Somatic mutations of   calreticulin in myeloproliferative  neoplasms. N  Engl J Med. 2013;369(73):6687-52.   ---------------------------------------------------------------------      CALR (NM_004343.3): 9; JAK2 (NM_004972.3): 12-14,16,20; MPL (NM_005373.2):   10    Less than 500X coverage was achieved in portions of the following exons;   additional mutations in these regions  cannot be entirely excluded: None    ---------------------------------------------------------------------  Electronic Signature  ---------------------------------------------------------------------  Electronically signed/cosigned by: Monisha Sousa  10/07/2019    Electronically Signed Out By:  Tyson Son M.D., PhD  [employer]    CPT Codes:  A: -YSUPGR, MPNEXPNGSO    TESTING LAB LOCATION:  [address] [id number] [phone]    COLLECTION SITE:  Client[facility]  [facility]  [facility]        Results for CRYS LOBO (MRN [id number]) as of 9/27/2019 12:54   Ref. Range 8/6/2019 14:27 9/27/2019 12:00   Erythropoietin Latest Ref Range: 4 - 27 mU/mL 15    Ferritin Latest Ref Range: 8 - 252 ng/mL 88    Iron Latest Ref Range: 35 - 180 ug/dL 117    Iron Binding Cap Latest Ref Range: 240 - 430 ug/dL 380    Iron Saturation Index Latest Ref Range: 15 - 46 % 31    Lactate Dehydrogenase Latest Ref Range: 81 - 234 U/L 189 202      ASSESSMENT AND PLAN   1. Polycythemia (negative for JAK2, CALR, MPN mutations and unremarkable marrow except for increased cellularity)  2. Chronic cigarette smoker  3. Obesity and possibly obstructive sleep apnea    I again extensively reviewed primary and secondary polycythemia which is a condition of increased red cell number and mass. I explained to her that polycythemia vera is due to the presence of a genetic mutation involving JAK2, CALR or MPN genes. She does not have primary polycythemia vera as she does not have either of these mutation on the peripheral blood next  generation sequencing. I did get bone marrow biopsy done for her. She does not have any abnormality except for mildly increase cellularity as expected without any maturation defect or dysplasia. There are no concerning findings on her bone marrow biopsy.     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 I again reviewed causes for this. I did get cardiac echocardiogram with bubble studies. She does not have arterial or venous shunts. Her PFT are essentially normal.     She does smoke and also is obese with concerns for obstructive sleep apnea. Both of these are quite likely the cause for her elevated hematocrit. There is nothing that needs to be done other than loose weight and stop smoking. She is working on this and notes that she plans to quite before new year.     I would like to see her in 6 months with labs prior to clinic visit - CBC, CMP    Over 25 min of direct face to face time spent with patient with more than 50% time spent in counseling and coordinating care.      Again, thank you for allowing me to participate in the care of your patient.        Sincerely,        Felipe Mcdaniels MD

## 2019-12-24 NOTE — PATIENT INSTRUCTIONS
Patient left without scheduling her 6 month follow up and labs with Dr. Mcdaneils. Deferred appointment until 4/23/2020. (KB 12/24/19)

## 2019-12-26 DIAGNOSIS — I10 ESSENTIAL HYPERTENSION: ICD-10-CM

## 2019-12-26 NOTE — TELEPHONE ENCOUNTER
"Requested Prescriptions   Pending Prescriptions Disp Refills     irbesartan (AVAPRO) 150 MG tablet [Pharmacy Med Name: Irbesartan 150 MG Oral Tablet]  0     Sig: TAKE 1 TABLET BY MOUTH AT BEDTIME   Last Written Prescription Date:  9/26/19  Last Fill Quantity: 90,  # refills: 0   Last office visit: 6/20/2019 with prescribing provider:  Giovani Vang PA-C   Future Office Visit:        Angiotensin-II Receptors Passed - 12/26/2019  1:53 PM        Passed - Last blood pressure under 140/90 in past 12 months     BP Readings from Last 3 Encounters:   12/23/19 106/74   12/16/19 112/78   11/27/19 120/70                 Passed - Recent (12 mo) or future (30 days) visit within the authorizing provider's specialty     Patient has had an office visit with the authorizing provider or a provider within the authorizing providers department within the previous 12 mos or has a future within next 30 days. See \"Patient Info\" tab in inbasket, or \"Choose Columns\" in Meds & Orders section of the refill encounter.              Passed - Medication is active on med list        Passed - Patient is age 18 or older        Passed - No active pregnancy on record        Passed - Normal serum creatinine on file in past 12 months     Recent Labs   Lab Test 11/08/19  0919   CR 0.74             Passed - Normal serum potassium on file in past 12 months     Recent Labs   Lab Test 11/08/19  0919   POTASSIUM 3.8                    Passed - No positive pregnancy test in past 12 months         "

## 2019-12-27 RX ORDER — IRBESARTAN 150 MG/1
TABLET ORAL
Qty: 90 TABLET | Refills: 1 | Status: SHIPPED | OUTPATIENT
Start: 2019-12-27 | End: 2020-03-06

## 2019-12-27 NOTE — TELEPHONE ENCOUNTER
Avapro  Prescription approved per [facility] [place].    Yandy Elias RN

## 2020-01-09 ENCOUNTER — OFFICE VISIT (OUTPATIENT)
Dept: SLEEP MEDICINE | Facility: CLINIC | Age: 55
End: 2020-01-09
Payer: COMMERCIAL

## 2020-01-09 DIAGNOSIS — R09.02 HYPOXEMIA: ICD-10-CM

## 2020-01-09 NOTE — Clinical Note
Sleep study and TSH ordered. No MyChart, I have tried to reach patient to tell her of plan to evaluate breathing and due CPAP titration to treat severe breathing problem at night.

## 2020-01-10 ENCOUNTER — DOCUMENTATION ONLY (OUTPATIENT)
Dept: SLEEP MEDICINE | Facility: CLINIC | Age: 55
End: 2020-01-10
Payer: COMMERCIAL

## 2020-01-10 DIAGNOSIS — E66.2 OBESITY HYPOVENTILATION SYNDROME (H): ICD-10-CM

## 2020-01-10 PROCEDURE — G0399 HOME SLEEP TEST/TYPE 3 PORTA: HCPCS

## 2020-01-10 NOTE — PROGRESS NOTES
This HSAT was performed using a Noxturnal T3 device which recorded snore, sound, movement activity, body position, nasal pressure, oronasal thermal airflow, pulse, oximetry and both chest and abdominal respiratory effort. HSAT data was restricted to the time patient states they were in bed.     HSAT was scored using 1B 4% hypopnea rule.     AHI: 11.9. Snoring was reported as mild.  Time with SpO2 below 89% was 314.3 minutes.   Overall signal quality was good     Pt will follow up with sleep provider to determine appropriate therapy.     Ordering MD, Phoebe, MD Tino Prater, RPS, RST System Clinical Specialist 01/10/2020

## 2020-01-14 PROBLEM — E66.2 OBESITY HYPOVENTILATION SYNDROME (H): Status: ACTIVE | Noted: 2020-01-14

## 2020-01-14 NOTE — PROCEDURES
"HOME SLEEP STUDY INTERPRETATION    Patient: Phuong New  MRN: [id number]  YOB: 1965  Study Date: 1/10/2020  Referring Provider: Giovani Vang;   Ordering Provider: KYLAH HARRELL MD     Indications for Home Study: Phuong New is a 54 year old female with a history of polycythemia, obesity hypoventilation with PaCO2 45 mmHg and PaO2 59 mmHg,  restless legs, and insomnia who presents with symptoms suggestive of obstructive sleep apnea. Recent evaluation has included a normal cardiac echo without shunting and normal diffusion, vital capacity and flows with low normal lung volumes.  Estimated body mass index is 38.63 kg/m  as calculated from the following:    Height as of 19: 1.6 m (5' 2.99\").    Weight as of 19: 98.9 kg (218 lb).  Total score - Butler: 6 (2019  2:45 PM)  STOP-BAN/8    Data: A full night home sleep study was performed recording the standard physiologic parameters including body position, movement, sound, nasal pressure, thermal oral airflow, chest and abdominal movements with respiratory inductance plethysmography, and oxygen saturation by pulse oximetry. Pulse rate was estimated by oximetry recording. This study was considered adequate based on > 4 hours of quality oximetry and respiratory recording. As specified by the AASM Manual for the Scoring of Sleep and Associated events, version 2.3, Rule VIII.D 1B, 4% oxygen desaturation scoring for hypopneas is used as a standard of care on all home sleep apnea testing.    Analysis Time:  348 minutes    Respiration:   Sleep Associated Hypoxemia: sustained hypoxemia was present. Baseline oxygen saturation was 86.3%.  Time with saturation less than or equal to 88% was 314 minutes. The lowest oxygen saturation was 70%.   Snoring: Snoring was present.  Respiratory events: The home study revealed a presence of 2 obstructive apneas and 2 mixed and central apneas. There were 65 hypopneas resulting in a combined " apnea/hypopnea index [AHI] of 11.9 events per hour.  AHI was 20 per hour supine, 0 per hour prone, - per hour on left side, and 9 per hour on right side.   Pattern: Excluding events noted above, respiratory rate and pattern was Normal.    Position: Percent of time spent: supine - 27%, prone - 0%, on left - 0%, on right - 73%.    Heart Rate: By pulse oximetry normal rate was noted.     Assessment:   Mild obstructive sleep apnea.  Obesity hypoventilation with prolonged sleep associated hypoxemia was present consider sleep associated hypoventilation.     Recommendations:  Consider polysomnography for evaluation and management of sleep-associated hypoventilation and hypoxemia.  Consider evaluation of other causes of hypoventilation including myxedema.   Suggest optimizing sleep hygiene and avoiding sleep deprivation.  Weight management.    Diagnosis Code(s): Obstructive Sleep Apnea G47.33, Hypoxemia G47.36    KYLAH HARRELL MD, January 14, 2020   Diplomate, American Board of Internal Medicine, Sleep Medicine

## 2020-01-21 ENCOUNTER — TELEPHONE (OUTPATIENT)
Dept: SLEEP MEDICINE | Facility: CLINIC | Age: 55
End: 2020-01-21

## 2020-01-21 NOTE — PROGRESS NOTES
Have left two voicemails asking Phuong to call nurseline to explain new orders placed by DR. Sandhu for in lab, and TSH blood work.  Patient has return visit to go over the results of HST on 1/24/20, with Dr. Sandhu.    (4) rarely moist

## 2020-01-22 ENCOUNTER — CARE COORDINATION (OUTPATIENT)
Dept: SLEEP MEDICINE | Facility: CLINIC | Age: 55
End: 2020-01-22

## 2020-01-22 ENCOUNTER — TELEPHONE (OUTPATIENT)
Dept: SLEEP MEDICINE | Facility: CLINIC | Age: 55
End: 2020-01-22

## 2020-01-22 DIAGNOSIS — E66.2 OBESITY HYPOVENTILATION SYNDROME (H): ICD-10-CM

## 2020-01-22 NOTE — TELEPHONE ENCOUNTER
Spoke with Phuong to schedule an in-lab study per Dr Sandhu.  She is calling [employer] number with codes for study to see what her [employer] will cover.  She had the TSH done last week at [facility], results were scanned into epic.  She will call and lvm if she wants to keep her return visit with Dr. Sandhu on 1/24/20, or cancel it.  She was seen at [facility] by  Who suggested she have gastric bypass surgery.  She will call nurseline to schedule in lab if she can afford it.

## 2020-01-29 ENCOUNTER — DOCUMENTATION ONLY (OUTPATIENT)
Dept: ONCOLOGY | Facility: CLINIC | Age: 55
End: 2020-01-29

## 2020-01-29 NOTE — TELEPHONE ENCOUNTER
Phuong was scheduled for a genetic counseling appointment today at [facility], but she did not come for her appointment.  If she wants to make another appointment with the Cancer Risk Management Program, she can call: [phone].    Margaret Coles MS, [facility]  Genetic Counselor  Ph: [phone]  Pager: [phone]

## 2020-02-05 DIAGNOSIS — N95.1 MENOPAUSAL SYNDROME (HOT FLASHES): ICD-10-CM

## 2020-02-05 RX ORDER — GABAPENTIN 600 MG/1
TABLET ORAL
Refills: 0 | OUTPATIENT
Start: 2020-02-05

## 2020-02-05 NOTE — TELEPHONE ENCOUNTER
It appears she has established with a new provider. Please have Phuong/or her pharmacy send the request to them.

## 2020-02-05 NOTE — TELEPHONE ENCOUNTER
Last Written Prescription Date:  11/08/19  Last Fill Quantity: 90,  # refills: 0   Last office visit: 6/20/2019 with prescribing provider:     Future Office Visit:    Requested Prescriptions   Pending Prescriptions Disp Refills     gabapentin (NEURONTIN) 600 MG tablet [Pharmacy Med Name: Gabapentin 600 MG Oral Tablet]  0     Sig: TAKE 1 TABLET BY MOUTH ONCE DAILY       There is no refill protocol information for this order

## 2020-02-06 NOTE — TELEPHONE ENCOUNTER
Pt states she has not established care with anyone, she saw a different provider once. She plans on scheduling an appointment soon and still needs this Rx. Please call to advise.     Enid Guerra on 2/6/2020 at 3:58 PM

## 2020-02-07 RX ORDER — GABAPENTIN 600 MG/1
600 TABLET ORAL DAILY
Qty: 90 TABLET | Refills: 0 | Status: SHIPPED | OUTPATIENT
Start: 2020-02-07 | End: 2020-05-08

## 2020-03-06 ENCOUNTER — OFFICE VISIT (OUTPATIENT)
Dept: FAMILY MEDICINE | Facility: CLINIC | Age: 55
End: 2020-03-06
Payer: COMMERCIAL

## 2020-03-06 VITALS
DIASTOLIC BLOOD PRESSURE: 84 MMHG | HEART RATE: 79 BPM | TEMPERATURE: 98.6 F | BODY MASS INDEX: 38.38 KG/M2 | OXYGEN SATURATION: 93 % | SYSTOLIC BLOOD PRESSURE: 118 MMHG | HEIGHT: 63 IN | WEIGHT: 216.6 LBS | RESPIRATION RATE: 18 BRPM

## 2020-03-06 DIAGNOSIS — I10 ESSENTIAL HYPERTENSION: ICD-10-CM

## 2020-03-06 DIAGNOSIS — R53.83 OTHER FATIGUE: Primary | ICD-10-CM

## 2020-03-06 DIAGNOSIS — D58.2 ELEVATED HEMOGLOBIN (H): ICD-10-CM

## 2020-03-06 DIAGNOSIS — E66.2 OBESITY HYPOVENTILATION SYNDROME (H): ICD-10-CM

## 2020-03-06 DIAGNOSIS — Z72.0 TOBACCO USE: ICD-10-CM

## 2020-03-06 DIAGNOSIS — E78.2 MIXED HYPERLIPIDEMIA: ICD-10-CM

## 2020-03-06 PROCEDURE — 99214 OFFICE O/P EST MOD 30 MIN: CPT | Performed by: PHYSICIAN ASSISTANT

## 2020-03-06 RX ORDER — ATORVASTATIN CALCIUM 40 MG/1
40 TABLET, FILM COATED ORAL DAILY
Qty: 90 TABLET | Refills: 3 | Status: SHIPPED | OUTPATIENT
Start: 2020-03-06 | End: 2020-07-01

## 2020-03-06 RX ORDER — CHLORTHALIDONE 25 MG/1
25 TABLET ORAL DAILY
Qty: 90 TABLET | Refills: 1 | Status: SHIPPED | OUTPATIENT
Start: 2020-03-06 | End: 2020-07-20

## 2020-03-06 RX ORDER — IRBESARTAN 150 MG/1
150 TABLET ORAL AT BEDTIME
Qty: 90 TABLET | Refills: 1 | Status: SHIPPED | OUTPATIENT
Start: 2020-03-06 | End: 2020-07-01

## 2020-03-06 ASSESSMENT — MIFFLIN-ST. JEOR: SCORE: 1536.62
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
## 2020-03-06 NOTE — PATIENT INSTRUCTIONS
"Get the SLEEP STUDY done    Www.Choosemyplate.gov      GET some Replens for the vaginal dryness/irritation      Varenicline Tartrate (Chantix):  Chantix tablets Days Dosage  0.5 mg once daily for the first three days.  0.5 mg  twice daily for the next four days.  1.0 mg twice daily from day 8 and forward.    How to take:  You will start taking Chantix one week before your quit date while you are still smoking. At first you will use the Chantix starter pack. Once you are finished with that, you will be on a \"maintenance dose\" that will probably stay the same for the rest of your treatment.  After a week of slowly increasing your dose, you will take Chantix twice a day. Take each dose after eating and with a full glass of water. Taking Chantix with food and water decreases nausea. That is also why you begin on a lower dose and slowly increase it. Once you begin the twice a day dosage, your 2 doses should be at least 8-10 hours apart and at least 4-5 hours before bedtime.     Duration of therapy: 12 weeks. An additional course of 12 weeks of treatment is recommended if the first 12 weeks were successful to improve long term abstinence.    Adverse reactions: The most common adverse reaction associated with varenicline treatment is nausea. Other common adverse effects include sleep disturbance, constipation, flatulence and vomiting.    There have been warnings from the FDA to be on the lookout for erratic behavior, suicidal ideation, or suicidal behavior. There is one case report of a death, though it appears alcohol consumption may have played a part in that case. Please report any behavior or mood changes as well as being aware of drowsiness. Be cautious when operating motor vehicles or dangerous machinery until the medication's effect on you is clear.        "

## 2020-05-08 DIAGNOSIS — N95.1 MENOPAUSAL SYNDROME (HOT FLASHES): ICD-10-CM

## 2020-05-08 RX ORDER — GABAPENTIN 600 MG/1
TABLET ORAL
Qty: 90 TABLET | Refills: 0 | Status: SHIPPED | OUTPATIENT
Start: 2020-05-08 | End: 2020-07-20

## 2020-05-08 NOTE — TELEPHONE ENCOUNTER
RX [place] reviewed:  reviewed- no concerns    [place]:  https://[place].[employer]/    Controlled Substance Refill Request for gabapentin (NEURONTIN) 600 MG tablet   Problem List Complete:  Yes   checked in past 3 months?  Yes 2/7/20, 11/8/19, 6/28/19      Gabapentin (NEURONTIN) 600 MG tablet       Last Written Prescription Date:  2/7/20  Last Fill Quantity: 90,   # refills: 0  Last Office Visit: 3/6/20  Future Office visit:       Routing refill request to provider for review/approval because:  Drug not on the [facility], UMP or  Health refill protocol or controlled substance    Valencia Maxwell RN on 5/8/2020 at 2:06 PM

## 2020-07-01 DIAGNOSIS — E78.2 MIXED HYPERLIPIDEMIA: ICD-10-CM

## 2020-07-01 DIAGNOSIS — I10 ESSENTIAL HYPERTENSION: ICD-10-CM

## 2020-07-01 RX ORDER — ATORVASTATIN CALCIUM 40 MG/1
40 TABLET, FILM COATED ORAL DAILY
Qty: 90 TABLET | Refills: 0 | Status: SHIPPED | OUTPATIENT
Start: 2020-07-01 | End: 2020-07-20

## 2020-07-01 RX ORDER — IRBESARTAN 150 MG/1
150 TABLET ORAL AT BEDTIME
Qty: 90 TABLET | Refills: 0 | Status: SHIPPED | OUTPATIENT
Start: 2020-07-01 | End: 2020-07-20

## 2020-07-01 NOTE — TELEPHONE ENCOUNTER
Pending Prescriptions:                       Disp   Refills    irbesartan (AVAPRO) 150 MG tablet         90 tab*1            Sig: Take 1 tablet (150 mg) by mouth At Bedtime    atorvastatin (LIPITOR) 40 MG tablet       90 tab*3            Sig: Take 1 tablet (40 mg) by mouth daily    Pt states they have recently moved, and would like their Rx sent now to [facility] in [place]. Please contact if there are further questions/concerns.    Veronica Alves on 7/1/2020 at 2:38 PM

## 2020-07-06 DIAGNOSIS — R71.8 ELEVATED HEMATOCRIT: ICD-10-CM

## 2020-07-06 DIAGNOSIS — R09.02 HYPOXEMIA: ICD-10-CM

## 2020-07-06 DIAGNOSIS — E66.2 OBESITY HYPOVENTILATION SYNDROME (H): ICD-10-CM

## 2020-07-06 DIAGNOSIS — E83.110 HEREDITARY HEMOCHROMATOSIS (H): ICD-10-CM

## 2020-07-06 DIAGNOSIS — E78.2 MIXED HYPERLIPIDEMIA: ICD-10-CM

## 2020-07-06 LAB
BASOPHILS # BLD AUTO: 0 10E9/L (ref 0–0.2)
BASOPHILS NFR BLD AUTO: 0.3 %
DIFFERENTIAL METHOD BLD: ABNORMAL
EOSINOPHIL # BLD AUTO: 0.2 10E9/L (ref 0–0.7)
EOSINOPHIL NFR BLD AUTO: 2.7 %
ERYTHROCYTE [DISTWIDTH] IN BLOOD BY AUTOMATED COUNT: 13.3 % (ref 10–15)
HCT VFR BLD AUTO: 52.4 % (ref 35–47)
HGB BLD-MCNC: 17.8 G/DL (ref 11.7–15.7)
LYMPHOCYTES # BLD AUTO: 1.6 10E9/L (ref 0.8–5.3)
LYMPHOCYTES NFR BLD AUTO: 22.9 %
MCH RBC QN AUTO: 31.8 PG (ref 26.5–33)
MCHC RBC AUTO-ENTMCNC: 34 G/DL (ref 31.5–36.5)
MCV RBC AUTO: 94 FL (ref 78–100)
MONOCYTES # BLD AUTO: 0.7 10E9/L (ref 0–1.3)
MONOCYTES NFR BLD AUTO: 9.6 %
NEUTROPHILS # BLD AUTO: 4.5 10E9/L (ref 1.6–8.3)
NEUTROPHILS NFR BLD AUTO: 64.5 %
PLATELET # BLD AUTO: 211 10E9/L (ref 150–450)
RBC # BLD AUTO: 5.6 10E12/L (ref 3.8–5.2)
WBC # BLD AUTO: 7 10E9/L (ref 4–11)

## 2020-07-06 PROCEDURE — 80061 LIPID PANEL: CPT | Performed by: PHYSICIAN ASSISTANT

## 2020-07-06 PROCEDURE — 36415 COLL VENOUS BLD VENIPUNCTURE: CPT | Performed by: PHYSICIAN ASSISTANT

## 2020-07-06 PROCEDURE — 80050 GENERAL HEALTH PANEL: CPT | Performed by: PHYSICIAN ASSISTANT

## 2020-07-07 LAB
ALBUMIN SERPL-MCNC: 3.9 G/DL (ref 3.4–5)
ALP SERPL-CCNC: 88 U/L (ref 40–150)
ALT SERPL W P-5'-P-CCNC: 43 U/L (ref 0–50)
ANION GAP SERPL CALCULATED.3IONS-SCNC: 7 MMOL/L (ref 3–14)
AST SERPL W P-5'-P-CCNC: 23 U/L (ref 0–45)
BILIRUB SERPL-MCNC: 0.7 MG/DL (ref 0.2–1.3)
BUN SERPL-MCNC: 12 MG/DL (ref 7–30)
CALCIUM SERPL-MCNC: 9.2 MG/DL (ref 8.5–10.1)
CHLORIDE SERPL-SCNC: 104 MMOL/L (ref 94–109)
CHOLEST SERPL-MCNC: 165 MG/DL
CO2 SERPL-SCNC: 29 MMOL/L (ref 20–32)
CREAT SERPL-MCNC: 0.71 MG/DL (ref 0.52–1.04)
GFR SERPL CREATININE-BSD FRML MDRD: >90 ML/MIN/{1.73_M2}
GLUCOSE SERPL-MCNC: 103 MG/DL (ref 70–99)
HDLC SERPL-MCNC: 46 MG/DL
LDLC SERPL CALC-MCNC: 84 MG/DL
NONHDLC SERPL-MCNC: 119 MG/DL
POTASSIUM SERPL-SCNC: 3.5 MMOL/L (ref 3.4–5.3)
PROT SERPL-MCNC: 7.9 G/DL (ref 6.8–8.8)
SODIUM SERPL-SCNC: 140 MMOL/L (ref 133–144)
TRIGL SERPL-MCNC: 176 MG/DL
TSH SERPL DL<=0.005 MIU/L-ACNC: 2.18 MU/L (ref 0.4–4)

## 2020-07-20 ENCOUNTER — OFFICE VISIT (OUTPATIENT)
Dept: FAMILY MEDICINE | Facility: CLINIC | Age: 55
End: 2020-07-20
Payer: COMMERCIAL

## 2020-07-20 VITALS
RESPIRATION RATE: 16 BRPM | SYSTOLIC BLOOD PRESSURE: 119 MMHG | OXYGEN SATURATION: 95 % | HEART RATE: 78 BPM | DIASTOLIC BLOOD PRESSURE: 80 MMHG | WEIGHT: 214.1 LBS | BODY MASS INDEX: 37.93 KG/M2 | TEMPERATURE: 98 F | HEIGHT: 63 IN

## 2020-07-20 DIAGNOSIS — R09.81 NASAL CONGESTION: ICD-10-CM

## 2020-07-20 DIAGNOSIS — R10.13 EPIGASTRIC PAIN: Primary | ICD-10-CM

## 2020-07-20 DIAGNOSIS — I10 ESSENTIAL HYPERTENSION: ICD-10-CM

## 2020-07-20 DIAGNOSIS — E78.2 MIXED HYPERLIPIDEMIA: ICD-10-CM

## 2020-07-20 DIAGNOSIS — E66.2 OBESITY HYPOVENTILATION SYNDROME (H): ICD-10-CM

## 2020-07-20 DIAGNOSIS — N95.1 MENOPAUSAL SYNDROME (HOT FLASHES): ICD-10-CM

## 2020-07-20 PROCEDURE — 99214 OFFICE O/P EST MOD 30 MIN: CPT | Performed by: PHYSICIAN ASSISTANT

## 2020-07-20 RX ORDER — CHLORTHALIDONE 25 MG/1
25 TABLET ORAL DAILY
Qty: 90 TABLET | Refills: 1 | Status: SHIPPED | OUTPATIENT
Start: 2020-07-20 | End: 2021-06-09

## 2020-07-20 RX ORDER — GABAPENTIN 600 MG/1
600 TABLET ORAL DAILY
Qty: 90 TABLET | Refills: 1 | Status: SHIPPED | OUTPATIENT
Start: 2020-07-20 | End: 2020-12-08

## 2020-07-20 RX ORDER — ATORVASTATIN CALCIUM 40 MG/1
40 TABLET, FILM COATED ORAL DAILY
Qty: 90 TABLET | Refills: 3 | Status: SHIPPED | OUTPATIENT
Start: 2020-07-20 | End: 2021-06-22

## 2020-07-20 RX ORDER — FLUTICASONE PROPIONATE 50 MCG
2 SPRAY, SUSPENSION (ML) NASAL DAILY
Qty: 16 G | Refills: 3 | Status: SHIPPED | OUTPATIENT
Start: 2020-07-20

## 2020-07-20 RX ORDER — IRBESARTAN 150 MG/1
150 TABLET ORAL AT BEDTIME
Qty: 90 TABLET | Refills: 1 | Status: SHIPPED | OUTPATIENT
Start: 2020-07-20 | End: 2021-03-18

## 2020-07-20 ASSESSMENT — MIFFLIN-ST. JEOR: SCORE: 1525.28
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
## 2020-07-20 NOTE — PATIENT INSTRUCTIONS
h pylori - to test for this we'd need to stop omeprazole for 7-10 days prior to testing    If this is negative, lets consider pursuing and endoscopy    Try the fiber powder!      Contact Dr. Sandhu!!! Lets get that going!

## 2020-07-21 ENCOUNTER — TELEPHONE (OUTPATIENT)
Dept: ONCOLOGY | Facility: CLINIC | Age: 55
End: 2020-07-21

## 2020-07-21 NOTE — TELEPHONE ENCOUNTER
We have left 3 message for patient to call and scheduled 05/28/20, 06/17/20, and then 07/03/20. We have never received a call back. Sent message to nurse to send out a letter .

## 2020-07-29 PROCEDURE — 87338 HPYLORI STOOL AG IA: CPT | Performed by: PHYSICIAN ASSISTANT

## 2020-07-30 DIAGNOSIS — R10.13 EPIGASTRIC PAIN: ICD-10-CM

## 2020-07-31 LAB — H PYLORI AG STL QL IA: NEGATIVE

## 2020-12-07 DIAGNOSIS — N95.1 MENOPAUSAL SYNDROME (HOT FLASHES): ICD-10-CM

## 2020-12-08 RX ORDER — GABAPENTIN 600 MG/1
600 TABLET ORAL DAILY
Qty: 90 TABLET | Refills: 1 | Status: SHIPPED | OUTPATIENT
Start: 2020-12-08 | End: 2021-06-09

## 2020-12-08 NOTE — TELEPHONE ENCOUNTER
Gabapentin 600 MG Oral Tablet (NEURONTIN)      Last Written Prescription Date:  7/20/2020  Last Fill Quantity: 90,   # refills: 1  Last Office Visit: 7/20/2020  Future Office visit:       Routing refill request to provider for review/approval because:  Drug not on the [facility], UMP or [facility] refill protocol or controlled substance.    Rowan Duron RN

## 2020-12-21 ENCOUNTER — TRANSFERRED RECORDS (OUTPATIENT)
Dept: HEALTH INFORMATION MANAGEMENT | Facility: CLINIC | Age: 55
End: 2020-12-21

## 2020-12-28 VITALS — HEIGHT: 63 IN | WEIGHT: 218 LBS | BODY MASS INDEX: 38.62 KG/M2

## 2020-12-28 ASSESSMENT — MIFFLIN-ST. JEOR: SCORE: 1542.97
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
## 2020-12-29 ENCOUNTER — VIRTUAL VISIT (OUTPATIENT)
Dept: SLEEP MEDICINE | Facility: CLINIC | Age: 55
End: 2020-12-29
Payer: COMMERCIAL

## 2020-12-29 DIAGNOSIS — G47.33 OSA (OBSTRUCTIVE SLEEP APNEA): Primary | ICD-10-CM

## 2020-12-29 PROCEDURE — 99214 OFFICE O/P EST MOD 30 MIN: CPT | Mod: 95 | Performed by: INTERNAL MEDICINE

## 2020-12-29 NOTE — NURSING NOTE
Order sent to [facility] for set up. Requested pt schedule a follow up so that we can get her scheduled for a HST once she has been on the device.    Elisa Garland [facility] ~[place]     Yes

## 2021-01-04 ENCOUNTER — TELEPHONE (OUTPATIENT)
Dept: SLEEP MEDICINE | Facility: CLINIC | Age: 56
End: 2021-01-04

## 2021-01-04 NOTE — TELEPHONE ENCOUNTER
I called patient today 01/04/21 at 10:48 am to see if she would like to get the bipap machine and went over her insurance with her as well. She will talk with her  and call us back.

## 2021-01-14 ENCOUNTER — HEALTH MAINTENANCE LETTER (OUTPATIENT)
Age: 56
End: 2021-01-14

## 2021-01-20 ENCOUNTER — TELEPHONE (OUTPATIENT)
Dept: SLEEP MEDICINE | Facility: CLINIC | Age: 56
End: 2021-01-20

## 2021-01-20 NOTE — TELEPHONE ENCOUNTER
I called patient today 01/20/21at 12:47 pm to see if she would like to get go forth with getting the Bipap machine through us. Per patient she will be going through a different vendor possibly [employer] because our retail price is $2,000 and [employer] is like $1,000. She would like to get the order sent to her. I let her know to contact Dr. Sandhu's office and have them mail her the order. She said she will call them.

## 2021-01-21 ENCOUNTER — TELEPHONE (OUTPATIENT)
Dept: SLEEP MEDICINE | Facility: CLINIC | Age: 56
End: 2021-01-21

## 2021-01-21 NOTE — TELEPHONE ENCOUNTER
Reason for call:  Other   Patient called regarding (reason for call): prescription  Additional comments: Patient is looking to have her CPAP prescription sent to her email so she can order from [facility]. Please send to [email or web address]    Phone number to reach patient:  Home number on file [phone] (home)    Best Time:  Any time    Can we leave a detailed message on this number?  YES    Travel screening: Not Applicable

## 2021-01-22 ENCOUNTER — NURSE TRIAGE (OUTPATIENT)
Dept: FAMILY MEDICINE | Facility: CLINIC | Age: 56
End: 2021-01-22

## 2021-01-22 NOTE — TELEPHONE ENCOUNTER
"Call from patient reporting red, itchy rash that started on Sunday and has spread all around the face, behind the ears and back of neck. Rash stings and skin in patches feel \"joyce\" per patient. Patient states she tried new product on face, a facial lotion on Friday and Saturday. Patient denies shortness of breath or noticeable drainage, no fever, nausea, vomiting or diarrhea. Per patient took benadryl, tried baking soda, olive oil, aloe vera, topical benadryl cream and rash is not getting better at all. Patient advised per protocol. Patient has appointment scheduled for Monday, would provider like to prescribe anything interm until appointment, pharmacy pended. Please advise,     Thank you          Additional Information    Localized rash present > 7 days    Protocols used: RASH OR REDNESS - [id number]      "

## 2021-01-22 NOTE — TELEPHONE ENCOUNTER
"Call from patient reporting red, itchy rash that started on Sunday and has spread all around the face, behind the ears and back of neck. Rash stings and skin in patches feel \"joyce\" per patient. Patient states she tried new product on face, a facial lotion on Friday and Saturday. Patient denies shortness of breath or noticeable drainage, no fever, nausea, vomiting or diarrhea. Per patient took benadryl, tried baking soda, olive oil, aloe vera, topical benadryl cream, hydrocortisone cream, and rash is not getting better at all. Patient advised per protocol. Patient has appointment scheduled for Monday, would provider like to prescribe anything interm until appointment, pharmacy pended. Please advise,     Thank you        Additional Information    Negative: Sounds like a life-threatening emergency to the triager    Negative: Possible contact with poison ivy or oak    Negative: Insect bite(s) suspected    Negative: Athlete's Foot suspected (i.e., itchy rash between the toes)    Negative: Jock Itch suspected (i.e., itchy rash on inner thighs near genital area)    Negative: Wound infection suspected (i.e., pain, spreading redness, or pus; in a cut, puncture, scrape or sutured wound)    Negative: Rash of external female genital area (vulva)    Negative: Rash of penis or scrotum    Negative: Small spot, skin growth, or mole    Negative: Fever and localized purple or blood-colored spots or dots that are not from injury or friction    Negative: Fever and localized rash is very painful    Negative: Patient sounds very sick or weak to the triager    Negative: Painful rash with multiple small blisters grouped together (i.e., dermatomal distribution or 'band' or 'stripe')    Negative: Localized rash is very painful (no fever)    Negative: Localized purple or blood-colored spots or dots that are not from injury or friction (no fever)    Negative: Lyme disease suspected (e.g., bull's-eye rash or tick bite / exposure)    Negative: " "Patient wants to be seen    Negative: Tender bumps in armpits    Negative: Pimples (localized) and no improvement after using CARE ADVICE    Negative: SEVERE local itching persists after 2 days of steroid cream    Negative: Applying cream or ointment and it causes severe itch, burning, or pain    Answer Assessment - Initial Assessment Questions  1. APPEARANCE of RASH: \"Describe the rash.\"       Red, joyce  2. LOCATION: \"Where is the rash located?\"       Face, neck, behind ears   3. NUMBER: \"How many spots are there?\"       All over face   4. SIZE: \"How big are the spots?\" (Inches, centimeters or compare to size of a coin)       N/a   5. ONSET: \"When did the rash start?\"       sunday  6. ITCHING: \"Does the rash itch?\" If so, ask: \"How bad is the itch?\"  (Scale 1-10; or mild, moderate, severe)      Yes moderate   7. PAIN: \"Does the rash hurt?\" If so, ask: \"How bad is the pain?\"  (Scale 1-10; or mild, moderate, severe)      Feels like burning, warm   8. OTHER SYMPTOMS: \"Do you have any other symptoms?\" (e.g., fever)      none  9. PREGNANCY: \"Is there any chance you are pregnant?\" \"When was your last menstrual period?\"      n/a    Protocols used: RASH OR REDNESS - [id number]    "

## 2021-01-25 ENCOUNTER — OFFICE VISIT (OUTPATIENT)
Dept: FAMILY MEDICINE | Facility: CLINIC | Age: 56
End: 2021-01-25
Payer: COMMERCIAL

## 2021-01-25 VITALS
HEIGHT: 63 IN | HEART RATE: 85 BPM | OXYGEN SATURATION: 100 % | BODY MASS INDEX: 37.92 KG/M2 | SYSTOLIC BLOOD PRESSURE: 110 MMHG | DIASTOLIC BLOOD PRESSURE: 80 MMHG | TEMPERATURE: 98.7 F | WEIGHT: 214 LBS

## 2021-01-25 DIAGNOSIS — L30.9 DERMATITIS: ICD-10-CM

## 2021-01-25 DIAGNOSIS — R21 RASH: Primary | ICD-10-CM

## 2021-01-25 DIAGNOSIS — Z23 NEED FOR PROPHYLACTIC VACCINATION AND INOCULATION AGAINST INFLUENZA: ICD-10-CM

## 2021-01-25 PROCEDURE — 90471 IMMUNIZATION ADMIN: CPT | Performed by: NURSE PRACTITIONER

## 2021-01-25 PROCEDURE — 90682 RIV4 VACC RECOMBINANT DNA IM: CPT | Performed by: NURSE PRACTITIONER

## 2021-01-25 PROCEDURE — 99213 OFFICE O/P EST LOW 20 MIN: CPT | Mod: 25 | Performed by: NURSE PRACTITIONER

## 2021-01-25 RX ORDER — DESONIDE 0.5 MG/ML
LOTION TOPICAL 2 TIMES DAILY
Qty: 59 ML | Refills: 0 | Status: SHIPPED | OUTPATIENT
Start: 2021-01-25 | End: 2021-05-03

## 2021-01-25 ASSESSMENT — MIFFLIN-ST. JEOR: SCORE: 1524.83

## 2021-01-25 NOTE — TELEPHONE ENCOUNTER
DME orders were sent to patient via [employer].    DME orders, Patient demographics, LOV chart note and HST faxed to [facility] at [phone].    Rosemary Zhao MA

## 2021-01-25 NOTE — PROGRESS NOTES
Assessment & Plan     Rash  Chronic, above the upper lip.  Referral given today for further evaluation, suspect may need biopsy given chronic nature and no improvement.   - DERMATOLOGY ADULT REFERRAL; Future    Dermatitis  Likely contact related to new product.  Discontinue product.  Trial topical steroid, discussed application.  If worsening could consider short course of oral steroids though is mild in nature and improving today so don't think this will be necessary.    - DERMATOLOGY ADULT REFERRAL; Future  - desonide (DESOWEN) 0.05 % external lotion; Apply topically 2 times daily For 1 week    Need for prophylactic vaccination and inoculation against influenza  administered today.   - INFLUENZA QUAD, RECOMBINANT, P-FREE (RIV4) (FLUBLOCK) [52663]                 Return in about 2 months (around 3/25/2021) for Preventive Visit, In-Clinic Visit.    CLAUDIA Crowe [facility] KEVIN Baca is a 55 year old who presents to clinic today for the following health issues     HPI       Rash  Onset/Duration: 1 week  Description  Location: Face  Character: blotchy, burning  Itching: mild  Intensity:  mild  Progression of Symptoms:  improving  Accompanying signs and symptoms:   Fever: no  Body aches or joint pain: no  Sore throat symptoms: no  Recent cold symptoms: runny nose  History:           Previous episodes of similar rash: None  New exposures:  None and lotions No7 lotion  Recent travel: no  Exposure to similar rash: no  Precipitating or alleviating factors: cold weather  Therapies tried and outcome: Benadryl/diphenhydramine, olive oil, aloe vera    Facial itching and redness for the past week.  Symptoms are a little better, but still there.      Rash above the upper lip has been present for many years.  Never gotten any help with it.  Has tried every cream and lotion on the market.  Lip burns and stings.      Review of Systems   Constitutional, HEENT, cardiovascular,  "pulmonary, gi and gu and skin systems are negative, except as otherwise noted.      Objective    /80   Pulse 85   Temp 98.7  F (37.1  C) (Oral)   Ht 1.6 m (5' 3\")   Wt 97.1 kg (214 lb)   SpO2 100%   BMI 37.91 kg/m    Body mass index is 37.91 kg/m .  Physical Exam   GENERAL: healthy, alert and no distress  EYES: Eyes grossly normal to inspection and conjunctivae and sclerae normal, papular erythematous rash adjacent to upper lip, no blisters or open areas  HENT: ear canals and TM's normal, nose and mouth without ulcers or lesions  NECK: no adenopathy, no asymmetry, masses, or scars and thyroid normal to palpation  SKIN: subtle erythematous patches over forehead, no flaking     No results found for any visits on 01/25/21.            "

## 2021-03-18 ENCOUNTER — TELEPHONE (OUTPATIENT)
Dept: FAMILY MEDICINE | Facility: CLINIC | Age: 56
End: 2021-03-18

## 2021-03-18 DIAGNOSIS — I10 ESSENTIAL HYPERTENSION: ICD-10-CM

## 2021-03-18 RX ORDER — IRBESARTAN 150 MG/1
150 TABLET ORAL AT BEDTIME
Qty: 90 TABLET | Refills: 0 | Status: SHIPPED | OUTPATIENT
Start: 2021-03-18 | End: 2021-06-09

## 2021-03-18 NOTE — TELEPHONE ENCOUNTER
BP Readings from Last 3 Encounters:   01/25/21 110/80   07/20/20 119/80   03/06/20 118/84     Potassium   Date Value Ref Range Status   07/06/2020 3.5 3.4 - 5.3 mmol/L Final     Creatinine   Date Value Ref Range Status   07/06/2020 0.71 0.52 - 1.04 mg/dL Final     Prescription [place] per [facility] [place].  Rowan Duron RN

## 2021-03-18 NOTE — TELEPHONE ENCOUNTER
Patient is needing refill on her IRBESARTAN  Sent to [facility]    Call to advise if she needs appointment or labs to get refill  [phone]  Ok to leave message

## 2021-04-30 NOTE — PROGRESS NOTES
SUBJECTIVE:   CC: Phuong New is an 56 year old woman who presents for preventive health visit.     {Split Bill scripting  The purpose of this visit is to discuss your medical history and prevent health problems before you are sick. You may be responsible for a co-pay, coinsurance, or deductible if your visit today includes services such as checking on a sore throat, having an x-ray or lab test, or treating and evaluating a new or existing condition :586006}  Patient has been advised of split billing requirements and indicates understanding: {Yes and No:045948}  HPI  {Add if <65 person on Medicare  - Required Questions (Optional):803462}  {Outside tests to abstract? :028686}    {additional problems to add (Optional):471349}    Today's PHQ-2 Score:   PHQ-2 ( 1999 Pfizer) 3/6/2020   Q1: Little interest or pleasure in doing things 0   Q2: Feeling down, depressed or hopeless 0   PHQ-2 Score 0       Abuse: Current or Past (Physical, Sexual or Emotional) - { :113290}  Do you feel safe in your environment? { :441505}    Have you ever done Advance Care Planning? (For example, a Health Directive, POLST, or a discussion with a medical provider or your loved ones about your wishes): { :574337}    Social History     Tobacco Use     Smoking status: Current Every Day Smoker     Packs/day: 1.00     Years: 40.00     Pack years: 40.00     Types: Cigarettes     Smokeless tobacco: Never Used   Substance Use Topics     Alcohol use: Yes     Comment: 2 beers daily 3-4 times weekly     {Rooming Staff- Complete this question if Prescreen response is not shown below for today's visit. If you drink alcohol do you typically have >3 drinks per day or >7 drinks per week? (Optional):625457}    No flowsheet data found.{add AUDIT responses (Optional) (A score of 7 for adult men is an indication of hazardous drinking; a score of 8 or more is an indication of an alcohol use disorder.  A score of 7 or more for adult women is an indication of  "hazardous drinking or an alchohol use disorder):345705}    Reviewed orders with patient.  Reviewed health maintenance and updated orders accordingly - { :373830::\"Yes\"}  {Chronicprobdata (optional):829657}    Breast Cancer Screening:  Any new diagnosis of family breast, ovarian, or bowel cancer? {Yes_Link to Screening / No:756961}    FSH-7: No flowsheet data found.  {If any of the questions to the BCRA (FHS-7) are answered yes, consider ordering referral for genetic counseling (Optional) :434548::\"click delete button to remove this line now\"}  {AMB Mammogram Decision Support (Optional) :637028}  Pertinent mammograms are reviewed under the imaging tab.    History of abnormal Pap smear: { :627357}     Reviewed and updated as needed this visit by clinical staff                 Reviewed and updated as needed this visit by Provider                {HISTORY OPTIONS (Optional):644700}    Review of Systems  {FEMALE ROS (Optional):038216}     OBJECTIVE:   There were no vitals taken for this visit.  Physical Exam  {Exam Choices (Optional):299368}    {Diagnostic Test Results (Optional):174938::\"Diagnostic Test Results:\",\"Labs reviewed in Epic\"}    ASSESSMENT/PLAN:   {Diag Picklist:592160}    Patient has been advised of split billing requirements and indicates understanding: {YES / NO:614042::\"Yes\"}  COUNSELING:  {FEMALE COUNSELING MESSAGES:694435::\"Reviewed preventive health counseling, as reflected in patient instructions\"}    Estimated body mass index is 37.91 kg/m  as calculated from the following:    Height as of 1/25/21: 1.6 m (5' 3\").    Weight as of 1/25/21: 97.1 kg (214 lb).    {Weight Management Plan (ACO) Complete if BMI is abnormal-  Ages 18-64  BMI >24.9.  Age 65+ with BMI <23 or >30 (Optional):025172}    She reports that she has been smoking cigarettes. She has a 40.00 pack-year smoking history. She has never used smokeless tobacco.  Tobacco Cessation Action Plan:   {TOBACCO CESSATION ACTION " PLAN:327424}      Counseling Resources:  ATP IV Guidelines  Pooled Cohorts Equation Calculator  Breast Cancer Risk Calculator  BRCA-Related Cancer Risk Assessment: FHS-7 Tool  FRAX Risk Assessment  ICSI Preventive Guidelines  Dietary Guidelines for Americans, 2010  USDA's MyPlate  ASA Prophylaxis  Lung CA Screening    Michelle Fernández MD  [facility]

## 2021-05-03 ENCOUNTER — OFFICE VISIT (OUTPATIENT)
Dept: FAMILY MEDICINE | Facility: CLINIC | Age: 56
End: 2021-05-03
Payer: COMMERCIAL

## 2021-05-03 VITALS
HEART RATE: 89 BPM | TEMPERATURE: 98.7 F | DIASTOLIC BLOOD PRESSURE: 70 MMHG | OXYGEN SATURATION: 96 % | SYSTOLIC BLOOD PRESSURE: 128 MMHG | BODY MASS INDEX: 36.55 KG/M2 | HEIGHT: 64 IN | WEIGHT: 214.1 LBS | RESPIRATION RATE: 16 BRPM

## 2021-05-03 DIAGNOSIS — E78.2 MIXED HYPERLIPIDEMIA: ICD-10-CM

## 2021-05-03 DIAGNOSIS — Z00.00 ROUTINE GENERAL MEDICAL EXAMINATION AT A HEALTH CARE FACILITY: Primary | ICD-10-CM

## 2021-05-03 DIAGNOSIS — L30.9 DERMATITIS: ICD-10-CM

## 2021-05-03 DIAGNOSIS — M25.50 ARTHRALGIA, UNSPECIFIED JOINT: ICD-10-CM

## 2021-05-03 DIAGNOSIS — E66.2 OBESITY HYPOVENTILATION SYNDROME (H): ICD-10-CM

## 2021-05-03 DIAGNOSIS — G47.33 OSA (OBSTRUCTIVE SLEEP APNEA): ICD-10-CM

## 2021-05-03 DIAGNOSIS — R71.8 ELEVATED RED BLOOD CELL COUNT: ICD-10-CM

## 2021-05-03 DIAGNOSIS — Z72.0 TOBACCO USE: ICD-10-CM

## 2021-05-03 DIAGNOSIS — Z96.642 STATUS POST TOTAL REPLACEMENT OF LEFT HIP: ICD-10-CM

## 2021-05-03 PROCEDURE — 99396 PREV VISIT EST AGE 40-64: CPT | Performed by: INTERNAL MEDICINE

## 2021-05-03 PROCEDURE — 99213 OFFICE O/P EST LOW 20 MIN: CPT | Mod: 25 | Performed by: INTERNAL MEDICINE

## 2021-05-03 ASSESSMENT — ENCOUNTER SYMPTOMS
DIARRHEA: 1
WEAKNESS: 1
ARTHRALGIAS: 1
HEADACHES: 1
MYALGIAS: 1
ABDOMINAL PAIN: 1

## 2021-05-03 ASSESSMENT — ACTIVITIES OF DAILY LIVING (ADL)
CURRENT_FUNCTION: HOUSEWORK REQUIRES ASSISTANCE
CURRENT_FUNCTION: SHOPPING REQUIRES ASSISTANCE
CURRENT_FUNCTION: LAUNDRY REQUIRES ASSISTANCE
CURRENT_FUNCTION: PREPARING MEALS REQUIRES ASSISTANCE

## 2021-05-03 ASSESSMENT — MIFFLIN-ST. JEOR: SCORE: 1528.21
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
## 2021-06-08 DIAGNOSIS — N95.1 MENOPAUSAL SYNDROME (HOT FLASHES): ICD-10-CM

## 2021-06-08 DIAGNOSIS — I10 ESSENTIAL HYPERTENSION: ICD-10-CM

## 2021-06-09 RX ORDER — GABAPENTIN 600 MG/1
TABLET ORAL
Qty: 90 TABLET | Refills: 1 | Status: SHIPPED | OUTPATIENT
Start: 2021-06-09 | End: 2021-12-09

## 2021-06-09 RX ORDER — IRBESARTAN 150 MG/1
TABLET ORAL
Qty: 90 TABLET | Refills: 1 | Status: SHIPPED | OUTPATIENT
Start: 2021-06-09 | End: 2021-06-22

## 2021-06-09 RX ORDER — CHLORTHALIDONE 25 MG/1
TABLET ORAL
Qty: 90 TABLET | Refills: 1 | Status: SHIPPED | OUTPATIENT
Start: 2021-06-09 | End: 2021-06-22

## 2021-06-09 NOTE — TELEPHONE ENCOUNTER
Prescription irbesartan and chlorthalidone approved per [facility] Refill Protocol.  Jann PONCE RN

## 2021-06-09 NOTE — TELEPHONE ENCOUNTER
Routing refill request to provider for review/approval because:  Drug not on the [facility] refill protocol     Jann PONCE RN

## 2021-06-09 NOTE — TELEPHONE ENCOUNTER
Most recently patient saw another provider for her annual . Nearly 1 year since I have seen. I will fill one time but further refills will require visit with me or per provider who did annual.

## 2021-06-22 ENCOUNTER — OFFICE VISIT (OUTPATIENT)
Dept: FAMILY MEDICINE | Facility: CLINIC | Age: 56
End: 2021-06-22
Payer: COMMERCIAL

## 2021-06-22 VITALS
RESPIRATION RATE: 16 BRPM | OXYGEN SATURATION: 93 % | SYSTOLIC BLOOD PRESSURE: 120 MMHG | HEIGHT: 63 IN | WEIGHT: 212.2 LBS | HEART RATE: 73 BPM | BODY MASS INDEX: 37.6 KG/M2 | DIASTOLIC BLOOD PRESSURE: 82 MMHG | TEMPERATURE: 99.2 F

## 2021-06-22 DIAGNOSIS — Z11.4 SCREENING FOR HIV (HUMAN IMMUNODEFICIENCY VIRUS): ICD-10-CM

## 2021-06-22 DIAGNOSIS — E83.110 HEREDITARY HEMOCHROMATOSIS (H): ICD-10-CM

## 2021-06-22 DIAGNOSIS — Z11.59 NEED FOR HEPATITIS C SCREENING TEST: ICD-10-CM

## 2021-06-22 DIAGNOSIS — E66.01 MORBID OBESITY (H): ICD-10-CM

## 2021-06-22 DIAGNOSIS — I10 ESSENTIAL HYPERTENSION: Primary | ICD-10-CM

## 2021-06-22 DIAGNOSIS — Z87.891 PERSONAL HISTORY OF TOBACCO USE: ICD-10-CM

## 2021-06-22 DIAGNOSIS — R10.84 ABDOMINAL PAIN, GENERALIZED: ICD-10-CM

## 2021-06-22 DIAGNOSIS — D58.2 ELEVATED HEMOGLOBIN (H): ICD-10-CM

## 2021-06-22 DIAGNOSIS — E78.2 MIXED HYPERLIPIDEMIA: ICD-10-CM

## 2021-06-22 DIAGNOSIS — D17.1 LIPOMA OF ABDOMINAL WALL: ICD-10-CM

## 2021-06-22 DIAGNOSIS — K76.0 HEPATIC STEATOSIS: ICD-10-CM

## 2021-06-22 LAB
ALBUMIN SERPL-MCNC: 3.8 G/DL (ref 3.4–5)
ALP SERPL-CCNC: 85 U/L (ref 40–150)
ALT SERPL W P-5'-P-CCNC: 35 U/L (ref 0–50)
ANION GAP SERPL CALCULATED.3IONS-SCNC: 4 MMOL/L (ref 3–14)
AST SERPL W P-5'-P-CCNC: 21 U/L (ref 0–45)
BILIRUB SERPL-MCNC: 0.5 MG/DL (ref 0.2–1.3)
BUN SERPL-MCNC: 11 MG/DL (ref 7–30)
CALCIUM SERPL-MCNC: 9.2 MG/DL (ref 8.5–10.1)
CHLORIDE SERPL-SCNC: 103 MMOL/L (ref 94–109)
CHOLEST SERPL-MCNC: 160 MG/DL
CO2 SERPL-SCNC: 31 MMOL/L (ref 20–32)
CREAT SERPL-MCNC: 0.72 MG/DL (ref 0.52–1.04)
ERYTHROCYTE [DISTWIDTH] IN BLOOD BY AUTOMATED COUNT: 13 % (ref 10–15)
GFR SERPL CREATININE-BSD FRML MDRD: >90 ML/MIN/{1.73_M2}
GLUCOSE SERPL-MCNC: 91 MG/DL (ref 70–99)
HCT VFR BLD AUTO: 52.4 % (ref 35–47)
HDLC SERPL-MCNC: 51 MG/DL
HGB BLD-MCNC: 17.7 G/DL (ref 11.7–15.7)
LDLC SERPL CALC-MCNC: 78 MG/DL
MCH RBC QN AUTO: 31.6 PG (ref 26.5–33)
MCHC RBC AUTO-ENTMCNC: 33.8 G/DL (ref 31.5–36.5)
MCV RBC AUTO: 93 FL (ref 78–100)
NONHDLC SERPL-MCNC: 109 MG/DL
PLATELET # BLD AUTO: 209 10E9/L (ref 150–450)
POTASSIUM SERPL-SCNC: 4 MMOL/L (ref 3.4–5.3)
PROT SERPL-MCNC: 7.7 G/DL (ref 6.8–8.8)
RBC # BLD AUTO: 5.61 10E12/L (ref 3.8–5.2)
SODIUM SERPL-SCNC: 138 MMOL/L (ref 133–144)
TRIGL SERPL-MCNC: 155 MG/DL
WBC # BLD AUTO: 6.8 10E9/L (ref 4–11)

## 2021-06-22 PROCEDURE — 85027 COMPLETE CBC AUTOMATED: CPT | Performed by: PHYSICIAN ASSISTANT

## 2021-06-22 PROCEDURE — 80061 LIPID PANEL: CPT | Performed by: PHYSICIAN ASSISTANT

## 2021-06-22 PROCEDURE — 99214 OFFICE O/P EST MOD 30 MIN: CPT | Performed by: PHYSICIAN ASSISTANT

## 2021-06-22 PROCEDURE — G0296 VISIT TO DETERM LDCT ELIG: HCPCS | Performed by: PHYSICIAN ASSISTANT

## 2021-06-22 PROCEDURE — 82043 UR ALBUMIN QUANTITATIVE: CPT | Performed by: PHYSICIAN ASSISTANT

## 2021-06-22 PROCEDURE — 87389 HIV-1 AG W/HIV-1&-2 AB AG IA: CPT | Performed by: PHYSICIAN ASSISTANT

## 2021-06-22 PROCEDURE — 36415 COLL VENOUS BLD VENIPUNCTURE: CPT | Performed by: PHYSICIAN ASSISTANT

## 2021-06-22 PROCEDURE — 80053 COMPREHEN METABOLIC PANEL: CPT | Performed by: PHYSICIAN ASSISTANT

## 2021-06-22 PROCEDURE — 86803 HEPATITIS C AB TEST: CPT | Performed by: PHYSICIAN ASSISTANT

## 2021-06-22 RX ORDER — IRBESARTAN 150 MG/1
TABLET ORAL
Qty: 90 TABLET | Refills: 1 | Status: SHIPPED | OUTPATIENT
Start: 2021-06-22 | End: 2022-03-09

## 2021-06-22 RX ORDER — CHLORTHALIDONE 25 MG/1
12.5 TABLET ORAL DAILY
Qty: 90 TABLET | Refills: 1
Start: 2021-06-22 | End: 2022-06-07

## 2021-06-22 RX ORDER — ATORVASTATIN CALCIUM 40 MG/1
40 TABLET, FILM COATED ORAL DAILY
Qty: 90 TABLET | Refills: 3 | Status: SHIPPED | OUTPATIENT
Start: 2021-06-22 | End: 2022-06-07

## 2021-06-22 ASSESSMENT — MIFFLIN-ST. JEOR: SCORE: 1515.62

## 2021-06-22 ASSESSMENT — PAIN SCALES - GENERAL: PAINLEVEL: MODERATE PAIN (4)

## 2021-06-22 NOTE — PATIENT INSTRUCTIONS
You can call [phone] to schedule your imaging at [facility].           Lung Cancer Screening   Frequently Asked Questions  If you are at high-risk for lung cancer, getting screened with low-dose computed tomography (LDCT) every year can help save your life. This handout offers answers to some of the most common questions about lung cancer screening. If you have other questions, please call [phone] ([phone]).     What is it?  Lung cancer screening uses special X-ray technology to create an image of your lung tissue. The exam is quick and easy and takes less than 10 seconds. We don t give you any medicine or use any needles. You can eat before and after the exam. You don t need to change your clothes as long as the clothing on your chest doesn t contain metal. But, you do need to be able to hold your breath for at least 6 seconds during the exam.    What is the goal of lung cancer screening?  The goal of lung cancer screening is to save lives. Many times, lung cancer is not found until a person starts having physical symptoms. Lung cancer screening can help detect lung cancer in the earliest stages when it may be easier to treat.    Who should be screened for lung cancer?  We suggest lung cancer screening for anyone who is at high-risk for lung cancer. You are in the high-risk group if you:      are between the ages of 55 and 79, and    have smoked at least 1 pack of cigarettes a day for 30 or more years, and    still smoke or have quit within the past 15 years.    However, if you have a new cough or shortness of breath, you should talk to your doctor before being screened.    Some national lung health advocacy groups also recommend screening for people ages 50 to 79 who have smoked an average of 1 pack of cigarettes a day for 20 years. They must also have at least 1 other risk factor for lung cancer, not including exposure to secondhand smoke. Other risk factors are having had cancer in  the past, emphysema, pulmonary fibrosis, COPD, a family history of lung cancer, or exposure to certain materials such as arsenic, asbestos, beryllium, cadmium, chromium, diesel fumes, nickel, radon or silica. Your care team can help you know if you have one of these risk factors.     Why does it matter if I have symptoms?  Certain symptoms can be a sign that you have a condition in your lungs that should be checked and treated by your doctor. These symptoms include fever, chest pain, a new or changing cough, shortness of breath that you have never felt before, coughing up blood or unexplained weight loss. Having any of these symptoms can greatly affect the results of lung cancer screening.       Should all smokers get an LDCT lung cancer screening exam?  It depends. Lung cancer screening is for a very specific group of men and women who have a history of heavy smoking over a long period of time (see  Who should be screened for lung cancer  above).  I am in the high-risk group, but have been diagnosed with cancer in the past. Is LDCT lung cancer screening right for me?  In some cases, you should not have LDCT lung screening, such as when your doctor is already following your cancer with CT scan studies. Your doctor will help you decide if LDCT lung screening is right for you.  Do I need to have a screening exam every year?  Yes. If you are in the high-risk group described earlier, you should get an LDCT lung cancer screening exam every year until you are 79, or are no longer willing or able to undergo screening and possible procedures to diagnose and treat lung cancer.  How effective is LDCT at preventing death from lung cancer?  Studies have shown that LDCT lung cancer screening can lower the risk of death from lung cancer by 20 percent in people who are at high-risk.  What are the risks?  There are some risks and limitations of LDCT lung cancer screening. We want to make sure you understand the risks and benefits,  so please let us know if you have any questions. Your doctor may want to talk with you more about these risks.    Radiation exposure: As with any exam that uses radiation, there is a very small increased risk of cancer. The amount of radiation in LDCT is small--about the same amount a person would get from a mammogram. Your doctor orders the exam when he or she feels the potential benefits outweigh the risks.    False negatives: No test is perfect, including LDCT. It is possible that you may have a medical condition, including lung cancer, that is not found during your exam. This is called a false negative result.    False positives and more testing: LDCT very often finds something in the lung that could be cancer, but in fact is not. This is called a false positive result. False positive tests often cause anxiety. To make sure these findings are not cancer, you may need to have more tests. These tests will be done only if you give us permission. Sometimes patients need a treatment that can have side effects, such as a biopsy. For more information on false positives, see  What can I expect from the results?     Findings not related to lung cancer: Your LDCT exam also takes pictures of areas of your body next to your lungs. In a very small number of cases, the CT scan will show an abnormal finding in one of these areas, such as your kidneys, adrenal glands, liver or thyroid. This finding may not be serious, but you may need more tests. Your doctor can help you decide what other tests you may need, if any.  What can I expect from the results?  About 1 out of 4 LDCT exams will find something that may need more tests. Most of the time, these findings are lung nodules. Lung nodules are very small collections of tissue in the lung. These nodules are very common, and the vast majority--more than 97 percent--are not cancer (benign). Most are normal lymph nodes or small areas of scarring from past infections.  But, if a  small lung nodule is found to be cancer, the cancer can be cured more than 90 percent of the time. To know if the nodule is cancer, we may need to get more images before your next yearly screening exam. If the nodule has suspicious features (for example, it is large, has an odd shape or grows over time), we will refer you to a specialist for further testing.  Will my doctor also get the results?  Yes. Your doctor will get a copy of your results.  Is it okay to keep smoking now that there s a cancer screening exam?  No. Tobacco is one of the strongest cancer-causing agents. It causes not only lung cancer, but other cancers and cardiovascular (heart) diseases as well. The damage caused by smoking builds over time. This means that the longer you smoke, the higher your risk of disease. While it is never too late to quit, the sooner you quit, the better.  Where can I find help to quit smoking?  The best way to prevent lung cancer is to stop smoking. If you have already quit smoking, congratulations and keep it up! For help on quitting smoking, please call [employer] at 4-857-998-DRSC (7470) or the American Cancer Society at 1-332.193.9129 to find local resources near you.  One-on-one health coaching:  If you d prefer to work individually with a health care provider on tobacco cessation, we offer:      Medication Therapy Management:  Our specially trained pharmacists work closely with you and your doctor to help you quit smoking.  Call [phone] or [phone] (toll free).     Can Do: Health coaching offered by [facility].  [email or web address]

## 2021-06-22 NOTE — PROGRESS NOTES
Assessment & Plan     Essential hypertension  We will refill at current dosing. Update labs  - chlorthalidone (HYGROTON) 25 MG tablet; Take 0.5 tablets (12.5 mg) by mouth daily  - Albumin Random Urine Quantitative with Creat Ratio  - Comprehensive metabolic panel (BMP + Alb, Alk Phos, ALT, AST, Total. Bili, TP)  - irbesartan (AVAPRO) 150 MG tablet; TAKE 1 TABLET(150 MG) BY MOUTH AT BEDTIME    Mixed hyperlipidemia  Updating las. Likely to continue same dosing  - atorvastatin (LIPITOR) 40 MG tablet; Take 1 tablet (40 mg) by mouth daily    Obesity (BMI 35.0-39.9) with comorbidity (H)  Continue to focus on lifestyle changes  - Lipid panel reflex to direct LDL Fasting    Personal history of tobacco use  Discussion re screening. She does elect to screen. She can call for chantix anytime.   - Prof fee: Shared Decisionmaking for Lung Cancer Screening  - CT Chest Lung Cancer Scrn Low Dose wo; Future    Lipoma of abdominal wall  Abdominal pain, generalized  Hepatic steatosis  WIth her symptoms and medical hx, ok to update imaging. Likely continued need to focus on BMs as well  - Lipid panel reflex to direct LDL Fasting  - CT Abdomen w Contrast; Future    Elevated hemoglobin (H)  Stable cbc  - CBC with platelets    Hereditary hemochromatosis (H)  Stable cbc  - CBC with platelets    Screening for HIV (human immunodeficiency virus)  Per CDC  - HIV Antigen Antibody Combo    Need for hepatitis C screening test  Per CDC  - Hepatitis C Screen Reflex to HCV RNA Quant and Genotype    Return in about 6 months (around 12/22/2021) for Follow up in 6 months for Medication Check.    JAQUAN Giron [facility] KEVIN Baca is a 56 year old who presents for the following health issues/concerns:      HPI     Hyperlipidemia Follow-Up      Are you regularly taking any medication or supplement to lower your cholesterol?   Yes- yes    Are you having muscle aches or other side effects that you  "think could be caused by your cholesterol lowering medication?  No    Hypertension Follow-up      Do you check your blood pressure regularly outside of the clinic? No     Are you following a low salt diet? No    Are your blood pressures ever more than 140 on the top number (systolic) OR more   than 90 on the bottom number (diastolic), for example 140/90? No      How many servings of fruits and vegetables do you eat daily?  0-1    On average, how many sweetened beverages do you drink each day (Examples: soda, juice, sweet tea, etc.  Do NOT count diet or artificially sweetened beverages)?   0    How many days per week do you exercise enough to make your heart beat faster? 3 or less    How many minutes a day do you exercise enough to make your heart beat faster? 9 or less    How many days per week do you miss taking your medication? 0    Patient presents for med check and discussion of other concerns    She had an ANNUAL with Dr. Fernández this past spring and would like to continue to see both of us, depending on her concerns. Prefers female to discuss certain issues    She is due for labs  She is fasting today  Does need refills of BP and cholesterol medications   Denies any CP or jama shortness of breath   Still smoking 1ppd; not on CPAP  Not completely ready to quit      She additionally mentions ongoing abd symptoms  Feeling \"icky\" everyday in her belly; adding to fatigue  Episodic aches/pains - can be sharp  Feels like her lipoma is growing - can get hard and be aching  She is very concerned about possible etiologies   Does drink a fair amount of ETOH - 3 drinks at least nightly  Hx of hepatic steatosis  Stools are still harder; says metamucil/fiber for a few months didn't help      Review of Systems   See above, otherwise negative      Objective    /82   Pulse 73   Temp 99.2  F (37.3  C) (Tympanic)   Resp 16   Ht 1.607 m (5' 3.25\")   Wt 96.3 kg (212 lb 3.2 oz)   SpO2 93%   BMI 37.29 kg/m    Body mass " index is 37.29 kg/m .  Physical Exam   GENERAL: healthy, alert and no distress  EYES: Eyes grossly normal to inspection, PERRL and conjunctivae and sclerae normal  RESP: lungs clear to auscultation - no rales, rhonchi or wheezes  CV: regular rates and rhythm  ABDOMEN: tenderness LLQ near palpable area of soft mass; bowel sounds normal; no organomegaly palpated  MS: No peripheral edema   PSYCH: mentation appears normal, affect normal/bright    Updating labs today  Reviewed CT abd/pelvix from 11/2017      Lung Cancer Screening Shared Decision Making Visit     Phuong New is eligible for lung cancer screening on the basis of the information provided in my signed lung cancer screening order.     I have discussed with patient the risks and benefits of screening for lung cancer with low-dose CT.     The risks include:  radiation exposure: one low dose chest CT has as much ionizing radiation as about 15 chest x-rays or 6 months of background radiation living in [place]    false positives: 96% of positive findings/nodules are NOT cancer, but some might still require additional diagnostic evaluation, including biopsy  over-diagnosis: some slow growing cancers that might never have been clinically significant will be detected and treated unnecessarily     The benefit of early detection of lung cancer is contingent upon adherence to annual screening or more frequent follow up if indicated.     Furthermore, reaping the benefits of screening requires Phuong New to be willing and physically able to undergo diagnostic procedures, if indicated. Although no specific guide is available for determining severity of comorbidities, it is reasonable to withhold screening in patients who have greater mortality risk from other diseases.     We did discuss that the only way to prevent lung cancer is to not smoke. Smoking cessation counseling was given, duration 3-10 minutes.      I did offer risk estimation using a calculator such  as this one:    ShouldIScreen

## 2021-06-23 LAB
CREAT UR-MCNC: 222 MG/DL
HCV AB SERPL QL IA: NONREACTIVE
HIV 1+2 AB+HIV1 P24 AG SERPL QL IA: NONREACTIVE
MICROALBUMIN UR-MCNC: 33 MG/L
MICROALBUMIN/CREAT UR: 14.91 MG/G CR (ref 0–25)

## 2021-07-01 ENCOUNTER — HOSPITAL ENCOUNTER (OUTPATIENT)
Dept: CT IMAGING | Facility: CLINIC | Age: 56
Discharge: HOME | End: 2021-07-01
Attending: PHYSICIAN ASSISTANT | Admitting: PHYSICIAN ASSISTANT
Payer: COMMERCIAL

## 2021-07-01 DIAGNOSIS — K76.0 HEPATIC STEATOSIS: ICD-10-CM

## 2021-07-01 DIAGNOSIS — Z87.891 PERSONAL HISTORY OF TOBACCO USE: ICD-10-CM

## 2021-07-01 PROCEDURE — 71271 CT THORAX LUNG CANCER SCR C-: CPT

## 2021-07-01 PROCEDURE — 250N000011 HC RX IP 250 OP 636: Performed by: PHYSICIAN ASSISTANT

## 2021-07-01 PROCEDURE — 74170 CT ABD WO CNTRST FLWD CNTRST: CPT

## 2021-07-01 PROCEDURE — 250N000009 HC RX 250: Performed by: PHYSICIAN ASSISTANT

## 2021-07-01 RX ORDER — IOPAMIDOL 755 MG/ML
500 INJECTION, SOLUTION INTRAVASCULAR ONCE
Status: COMPLETED | OUTPATIENT
Start: 2021-07-01 | End: 2021-07-01

## 2021-07-01 RX ADMIN — IOPAMIDOL 98 ML: 755 INJECTION, SOLUTION INTRAVENOUS at 14:40

## 2021-07-01 RX ADMIN — SODIUM CHLORIDE 63 ML: 9 INJECTION, SOLUTION INTRAVENOUS at 14:40

## 2021-09-20 NOTE — TELEPHONE ENCOUNTER
"avapro  LRF 3/6/2020  LOV 3/6/2020    lipitor  LRF 3/6/2020  LOV 3/6/2020    Requested Prescriptions   Pending Prescriptions Disp Refills     irbesartan (AVAPRO) 150 MG tablet 90 tablet 1     Sig: Take 1 tablet (150 mg) by mouth At Bedtime       Angiotensin-II Receptors Passed - 7/1/2020  2:40 PM        Passed - Last blood pressure under 140/90 in past 12 months     BP Readings from Last 3 Encounters:   03/06/20 118/84   12/23/19 106/74   12/16/19 112/78                 Passed - Recent (12 mo) or future (30 days) visit within the authorizing provider's specialty     Patient has had an office visit with the authorizing provider or a provider within the authorizing providers department within the previous 12 mos or has a future within next 30 days. See \"Patient Info\" tab in inbasket, or \"Choose Columns\" in Meds & Orders section of the refill encounter.              Passed - Medication is active on med list        Passed - Patient is age 18 or older        Passed - No active pregnancy on record        Passed - Normal serum creatinine on file in past 12 months     Recent Labs   Lab Test 11/08/19  0919   CR 0.74       Ok to refill medication if creatinine is low          Passed - Normal serum potassium on file in past 12 months     Recent Labs   Lab Test 11/08/19  0919   POTASSIUM 3.8                    Passed - No positive pregnancy test in past 12 months           atorvastatin (LIPITOR) 40 MG tablet 90 tablet 3     Sig: Take 1 tablet (40 mg) by mouth daily       Statins Protocol Failed - 7/1/2020  2:40 PM        Failed - LDL on file in past 12 months     Recent Labs   Lab Test 03/01/19                Passed - No abnormal creatine kinase in past 12 months     No lab results found.             Passed - Recent (12 mo) or future (30 days) visit within the authorizing provider's specialty     Patient has had an office visit with the authorizing provider or a provider within the authorizing providers department within " [facility]  Last visit: 11/12/20  Follow up visit: none  Last refill: 3/22/19    Okay to refill?  MD advise     "the previous 12 mos or has a future within next 30 days. See \"Patient Info\" tab in inbasket, or \"Choose Columns\" in Meds & Orders section of the refill encounter.              Passed - Medication is active on med list        Passed - Patient is age 18 or older        Passed - No active pregnancy on record        Passed - No positive pregnancy test in past 12 months           Prescription approved per [facility] Refill Protocol for avapro    Routing refill request to provider for review/approval because:  Labs not current:  Due for lipids    Will forward to the station, please try and help the pt schedule an appt for fasting labs.  Thanks!            "

## 2021-10-24 ENCOUNTER — HEALTH MAINTENANCE LETTER (OUTPATIENT)
Age: 56
End: 2021-10-24

## 2021-12-05 DIAGNOSIS — N95.1 MENOPAUSAL SYNDROME (HOT FLASHES): ICD-10-CM

## 2021-12-08 NOTE — TELEPHONE ENCOUNTER
gabapentin (NEURONTIN) 600 MG tablet        Last Written Prescription Date:  6/9/2021  Last Fill Quantity: 90,   # refills: 1  Last Office Visit: 6/22/2021  Future Office visit:       Routing refill request to provider for review/approval because:  Drug not on the [facility], [facility] or [facility] refill protocol or controlled substance    Rowan Duron RN

## 2021-12-09 RX ORDER — GABAPENTIN 600 MG/1
TABLET ORAL
Qty: 90 TABLET | Refills: 1 | Status: SHIPPED | OUTPATIENT
Start: 2021-12-09 | End: 2022-06-07

## 2021-12-13 ENCOUNTER — OFFICE VISIT (OUTPATIENT)
Dept: DERMATOLOGY | Facility: CLINIC | Age: 56
End: 2021-12-13
Payer: COMMERCIAL

## 2021-12-13 VITALS — SYSTOLIC BLOOD PRESSURE: 118 MMHG | HEART RATE: 74 BPM | DIASTOLIC BLOOD PRESSURE: 84 MMHG

## 2021-12-13 DIAGNOSIS — L40.8 SEBOPSORIASIS: Primary | ICD-10-CM

## 2021-12-13 DIAGNOSIS — L29.9 LOCALIZED PRURITUS: ICD-10-CM

## 2021-12-13 DIAGNOSIS — L25.9 CONTACT DERMATITIS, UNSPECIFIED CONTACT DERMATITIS TYPE, UNSPECIFIED TRIGGER: ICD-10-CM

## 2021-12-13 PROCEDURE — 99214 OFFICE O/P EST MOD 30 MIN: CPT | Performed by: PHYSICIAN ASSISTANT

## 2021-12-13 RX ORDER — CLOBETASOL PROPIONATE 0.5 MG/ML
SOLUTION TOPICAL 2 TIMES DAILY
Qty: 50 ML | Refills: 3 | Status: SHIPPED | OUTPATIENT
Start: 2021-12-13 | End: 2022-03-16

## 2021-12-13 RX ORDER — KETOCONAZOLE 20 MG/ML
SHAMPOO TOPICAL
Qty: 120 ML | Refills: 11 | Status: SHIPPED | OUTPATIENT
Start: 2021-12-13

## 2021-12-13 RX ORDER — TRIAMCINOLONE ACETONIDE 0.25 MG/G
CREAM TOPICAL 2 TIMES DAILY
Qty: 30 G | Refills: 1 | Status: SHIPPED | OUTPATIENT
Start: 2021-12-13
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
## 2021-12-13 NOTE — PATIENT INSTRUCTIONS
Proper skin care from [facility]:    -Eliminate harsh soaps as they strip the natural oils from the skin, often resulting in dry itchy skin ( i.e. Dial, Zest, Khadijah Spring)  -Use mild soaps such as Cetaphil or Dove Sensitive Skin in the shower. You do not need to use soap on arms, legs, and trunk every time you shower unless visibly soiled.   -Avoid hot or cold showers.  -After showering, lightly dry off and apply moisturizing within 2-3 minutes. This will help trap moisture in the skin.   -Aggressive use of a moisturizer at least 1-2 times a day to the entire body (including -Vanicream, Cetaphil, Aquaphor or Cerave) and moisturize hands after every washing.  -We recommend using moisturizers that come in a tub that needs to be scooped out, not a pump. This has more of an oil base. It will hold moisture in your skin much better than a water base moisturizer. The above recommended are non-pore clogging.      Wear a sunscreen with at least SPF 30 on your face, ears, neck and V of the chest daily. Wear sunscreen on other areas of the body if those areas are exposed to the sun throughout the day. Sunscreens can contain physical and/or chemical blockers. Physical blockers are less likely to clog pores, these include zinc oxide and titanium dioxide. Reapply every two hour and after swimming.     Sunscreen examples: https://www.ewg.org/sunscreen/    UV radiation  UVA radiation remains constant throughout the day and throughout the year. It is a longer wavelength than UVB and therefore penetrates deeper into the skin leading to immediate and delayed tanning, photoaging, and skin cancer. 70-80% of UVA and UVB radiation occurs between the hours of 10am-2pm.  UVB radiation  UVB radiation causes the most harmful effects and is more significant during the summer months. However, snow and ice can reflect UVB radiation leading to skin damage during the winter months as well. UVB radiation is responsible for tanning,  burning, inflammation, delayed erythema (pinkness), pigmentation (brown spots), and skin cancer.     I recommend self monthly full body exams and yearly full body exams with a dermatology provider. If you develop a new or changing lesion please follow up for examination. Most skin cancers are pink and scaly or pink and pearly. However, we do see blue/brown/black skin cancers.  Consider the ABCDEs of melanoma when giving yourself your monthly full body exam ( don't forget the groin, buttocks, feet, toes, etc). A-asymmetry, B-borders, C-color, D-diameter, E-elevation or evolving. If you see any of these changes please follow up in clinic. If you cannot see your back I recommend purchasing a hand held mirror to use with a larger wall mirror.            Chronic condition that cannot be cured, but it can be managed.     Scalp:   Nizoral: Wash scalp daily with a medicated shampoo discussed with provider. Wet affected area daily, apply shampoo and lather, let sit for 3-5 minutes and then rinse.   If multiple shampoos discussed begin  two prescription shampoos or one prescription shampoo and one over the counter shampoo  (examples: Head and shoulders, Selsen Blue, Ketoconazole, T-Sal, and/or T-gel.     clobetasol: Apply a thin layer to affected area on scalp 2x a day x 4-6 weeks, tapering with improvement. Do not apply to face or body folds.     Face:   Apply Triamcinolone 0.025% to affected area on face 2x a day for 1-2 weeks tapering with improvement.     Side effects of topical steroids including but not limited to atrophy (skin thinning), striae (stretch marks) telangiectasias, steroid acne, and others. Do not apply to normal skin. Do not apply to discolored skin that does not have rash present.     Follow up in 4-6 weeks.

## 2022-02-14 ENCOUNTER — E-VISIT (OUTPATIENT)
Dept: FAMILY MEDICINE | Facility: CLINIC | Age: 57
End: 2022-02-14
Payer: COMMERCIAL

## 2022-02-14 DIAGNOSIS — R21 RASH: Primary | ICD-10-CM

## 2022-02-14 PROCEDURE — 99421 OL DIG E/M SVC 5-10 MIN: CPT | Performed by: PHYSICIAN ASSISTANT

## 2022-03-07 ENCOUNTER — OFFICE VISIT (OUTPATIENT)
Dept: FAMILY MEDICINE | Facility: CLINIC | Age: 57
End: 2022-03-07
Payer: COMMERCIAL

## 2022-03-07 VITALS
HEART RATE: 88 BPM | RESPIRATION RATE: 20 BRPM | BODY MASS INDEX: 36.55 KG/M2 | WEIGHT: 208 LBS | TEMPERATURE: 98 F | OXYGEN SATURATION: 94 % | SYSTOLIC BLOOD PRESSURE: 108 MMHG | DIASTOLIC BLOOD PRESSURE: 80 MMHG

## 2022-03-07 DIAGNOSIS — Z80.3 FAMILY HISTORY OF BREAST CANCER IN FIRST DEGREE RELATIVE: ICD-10-CM

## 2022-03-07 DIAGNOSIS — N60.11 FIBROCYSTIC CHANGES OF RIGHT BREAST: Primary | ICD-10-CM

## 2022-03-07 DIAGNOSIS — N63.0 LUMP OR MASS IN BREAST: ICD-10-CM

## 2022-03-07 PROCEDURE — 99213 OFFICE O/P EST LOW 20 MIN: CPT | Performed by: PHYSICIAN ASSISTANT

## 2022-03-07 ASSESSMENT — PAIN SCALES - GENERAL: PAINLEVEL: MILD PAIN (2)

## 2022-03-07 NOTE — PROGRESS NOTES
"  Assessment & Plan     Fibrocystic changes of right breast  Diagnostic mammog with US ordered.  - MA Diagnostic Digital Bilateral; Future    Lump or mass in breast  See above.  - [facility] Diagnostic Digital Bilateral; Future    Family history of breast cancer in first degree relative  As above.  - [facility] Diagnostic Digital Bilateral; Future    BMI:   Estimated body mass index is 36.55 kg/m  as calculated from the following:    Height as of 6/22/21: 1.607 m (5' 3.25\").    Weight as of this encounter: 94.3 kg (208 lb).   Weight management plan: Discussed healthy diet and exercise guidelines      Return in about 2 weeks (around 3/21/2022) for mammogram.    Gary Denton PA-C  [facility] KEVIN Cabrera is a 57 year old who presents for the following health issues     History of Present Illness       Back Pain:  She presents for follow up of back pain. Patient's back pain is a chronic problem.  Location of back pain:  Right lower back, left lower back, right upper back, right hip and left hip  Description of back pain: dull ache  Back pain spreads: right shoulder and right side of neck    Since patient first noticed back pain, pain is: unchanged  Does back pain interfere with her job:  Not applicable      Hyperlipidemia:  She presents for follow up of hyperlipidemia.  She is taking medication to lower cholesterol. She is having myalgia or other side effects to statin medications.    Hypertension: She presents for follow up of hypertension.  She does not check blood pressure  regularly outside of the clinic. Outpatient blood pressures have not been over 140/90. She does not follow a low salt diet.     Migraines:   Since the patient's last clinic visit, headaches are: no change  The patient is getting headaches:  A few times a week  She is able to do normal daily activities when she has a migraine.  The patient is taking the following rescue/relief medications:  Tylenol   Patient states \"I get " "some relief\" from the rescue/relief medications.   The patient is taking the following medications to prevent migraines:  No medications to prevent migraines  In the past 4 weeks, the patient has gone to an Urgent Care or Emergency Room 0 times times due to headaches.    Reason for visit:  Breast exam for referral    She eats 0-1 servings of fruits and vegetables daily.She consumes 2 sweetened beverage(s) daily.She exercises with enough effort to increase her heart rate 10 to 19 minutes per day.  She exercises with enough effort to increase her heart rate 3 or less days per week.   She is taking medications regularly.       Concern - Breast lumps  Onset: several months  Description: right breast, feels lumps  Intensity: mild - does have some tenderness to them  Progression of Symptoms:  same and constant  Accompanying Signs & Symptoms: weight loss  Previous history of similar problem: family history of breast cancer  Precipitating factors:        Worsened by: NA  Alleviating factors:        Improved by: NA  Therapies tried and outcome:  none     Patient was going to try and schedule mammogram but had noticed some lumps in right breast.  3 sisters and mom have all had breast cancer.  One sister passed away at age 57.    Right breast- felt some twinges of pain then noticed a couple spots that seemed more lumpy than normal.  No nipple discharge or skin changes though she is struggling with worsening psoriasis currently.      Review of Systems   Constitutional, HEENT, cardiovascular, pulmonary, gi and gu systems are negative, except as otherwise noted.      Objective    /80 (BP Location: Right arm, Patient Position: Sitting, Cuff Size: Adult Regular)   Pulse 88   Temp 98  F (36.7  C) (Oral)   Resp 20   Wt 94.3 kg (208 lb)   SpO2 94%   BMI 36.55 kg/m    Body mass index is 36.55 kg/m .  Physical Exam   GENERAL: healthy, alert and no distress  BREAST: no palpable axillary masses or adenopathy and fibrocystic " changes right upper breast without discrete lump- left breast exam normal  PSYCH: mentation appears normal, affect normal/bright

## 2022-03-08 DIAGNOSIS — I10 ESSENTIAL HYPERTENSION: ICD-10-CM

## 2022-03-09 RX ORDER — IRBESARTAN 150 MG/1
TABLET ORAL
Qty: 90 TABLET | Refills: 0 | Status: SHIPPED | OUTPATIENT
Start: 2022-03-09 | End: 2022-06-07

## 2022-03-11 ENCOUNTER — HOSPITAL ENCOUNTER (OUTPATIENT)
Dept: MAMMOGRAPHY | Facility: CLINIC | Age: 57
Discharge: HOME | End: 2022-03-11
Attending: PHYSICIAN ASSISTANT
Payer: COMMERCIAL

## 2022-03-11 ENCOUNTER — HOSPITAL ENCOUNTER (OUTPATIENT)
Dept: ULTRASOUND IMAGING | Facility: CLINIC | Age: 57
Discharge: HOME | End: 2022-03-11
Attending: PHYSICIAN ASSISTANT
Payer: COMMERCIAL

## 2022-03-11 DIAGNOSIS — N63.0 LUMP OR MASS IN BREAST: ICD-10-CM

## 2022-03-11 DIAGNOSIS — Z80.3 FAMILY HISTORY OF BREAST CANCER IN FIRST DEGREE RELATIVE: ICD-10-CM

## 2022-03-11 DIAGNOSIS — N60.11 FIBROCYSTIC CHANGES OF RIGHT BREAST: ICD-10-CM

## 2022-03-11 PROCEDURE — 76642 ULTRASOUND BREAST LIMITED: CPT | Mod: RT

## 2022-03-11 PROCEDURE — 77062 BREAST TOMOSYNTHESIS BI: CPT

## 2022-03-16 ENCOUNTER — OFFICE VISIT (OUTPATIENT)
Dept: DERMATOLOGY | Facility: CLINIC | Age: 57
End: 2022-03-16
Payer: COMMERCIAL

## 2022-03-16 VITALS — DIASTOLIC BLOOD PRESSURE: 64 MMHG | SYSTOLIC BLOOD PRESSURE: 95 MMHG | OXYGEN SATURATION: 96 % | HEART RATE: 85 BPM

## 2022-03-16 DIAGNOSIS — L29.9 LOCALIZED PRURITUS: ICD-10-CM

## 2022-03-16 DIAGNOSIS — L60.8 MEDIAN NAIL DYSTROPHY: ICD-10-CM

## 2022-03-16 DIAGNOSIS — L40.8 SEBOPSORIASIS: ICD-10-CM

## 2022-03-16 DIAGNOSIS — L40.9 PSORIASIS: Primary | ICD-10-CM

## 2022-03-16 PROCEDURE — 99214 OFFICE O/P EST MOD 30 MIN: CPT | Performed by: PHYSICIAN ASSISTANT

## 2022-03-16 RX ORDER — CLOBETASOL PROPIONATE 0.5 MG/G
CREAM TOPICAL 2 TIMES DAILY
Qty: 60 G | Refills: 1 | Status: SHIPPED | OUTPATIENT
Start: 2022-03-16

## 2022-03-16 RX ORDER — CLOBETASOL PROPIONATE 0.5 MG/ML
SOLUTION TOPICAL 2 TIMES DAILY
Qty: 50 ML | Refills: 3 | Status: SHIPPED | OUTPATIENT
Start: 2022-03-16
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
## 2022-03-16 NOTE — PATIENT INSTRUCTIONS
Proper skin care from [facility]:    -Eliminate harsh soaps as they strip the natural oils from the skin, often resulting in dry itchy skin ( i.e. Dial, Zest, [place] Spring)  -Use mild soaps such as Cetaphil or Dove Sensitive Skin in the shower. You do not need to use soap on arms, legs, and trunk every time you shower unless visibly soiled.   -Avoid hot or cold showers.  -After showering, lightly dry off and apply moisturizing within 2-3 minutes. This will help trap moisture in the skin.   -Aggressive use of a moisturizer at least 1-2 times a day to the entire body (including -Vanicream, Cetaphil, Aquaphor or Cerave) and moisturize hands after every washing.  -We recommend using moisturizers that come in a tub that needs to be scooped out, not a pump. This has more of an oil base. It will hold moisture in your skin much better than a water base moisturizer. The above recommended are non-pore clogging.      Wear a sunscreen with at least SPF 30 on your face, ears, neck and V of the chest daily. Wear sunscreen on other areas of the body if those areas are exposed to the sun throughout the day. Sunscreens can contain physical and/or chemical blockers. Physical blockers are less likely to clog pores, these include zinc oxide and titanium dioxide. Reapply every two hour and after swimming.     Sunscreen examples: https://www.ewg.org/sunscreen/    UV radiation  UVA radiation remains constant throughout the day and throughout the year. It is a longer wavelength than UVB and therefore penetrates deeper into the skin leading to immediate and delayed tanning, photoaging, and skin cancer. 70-80% of UVA and UVB radiation occurs between the hours of 10am-2pm.  UVB radiation  UVB radiation causes the most harmful effects and is more significant during the summer months. However, snow and ice can reflect UVB radiation leading to skin damage during the winter months as well. UVB radiation is responsible for tanning,  burning, inflammation, delayed erythema (pinkness), pigmentation (brown spots), and skin cancer.     I recommend self monthly full body exams and yearly full body exams with a dermatology provider. If you develop a new or changing lesion please follow up for examination. Most skin cancers are pink and scaly or pink and pearly. However, we do see blue/brown/black skin cancers.  Consider the ABCDEs of melanoma when giving yourself your monthly full body exam ( don't forget the groin, buttocks, feet, toes, etc). A-asymmetry, B-borders, C-color, D-diameter, E-elevation or evolving. If you see any of these changes please follow up in clinic. If you cannot see your back I recommend purchasing a hand held mirror to use with a larger wall mirror.        Chronic condition that cannot be cured, but it can be managed.   Scalp:   Nizoral: wash 1-2x a week with a medicated shampoo discussed with provider. Wet affected area daily, apply shampoo and lather, let sit for 3-5 minutes and then rinse.   If multiple shampoos discussed begin  two prescription shampoos or one prescription shampoo and one over the counter shampoo  (examples: Head and shoulders, Selsen Blue, Ketoconazole, T-Sal, and/or T-gel.      Clobetasol: Apply a thin layer to affected area on scalp 2x a day x 4-6 weeks, tapering with improvement. Do not apply to face or body folds.      Face:   Apply Triamcinolone 0.025% to affected area on ears face ( eyelid area and lip area) 2x a day for 1-2 weeks tapering with improvement. If lip does not heal please follow up.  Consider washing eye area with baby shampoo    Hands: Apply a thin layer of clobetasol cream to affected area on hands 2x a day for 2-4 weeks. Tapering with improvement. Do not use on face or body folds.  Side effects of topical steroids including but not limited to atrophy (skin thinning), striae (stretch marks) telangiectasias, steroid acne, and others. Do not apply to normal skin. Do not apply to  discolored skin that does not have rash present. Educated patient on post inflammatory hyperpigmentation.     Begin use of wet-dry wraps. This can help drive the steroid and moisturizer deep into the skin resulting in greater relief. First, apply a thin layer of your topical steroid. Next, apply a thick layer of cream or emollient such as Vaseline or Aquaphor. Lastly, cover with moist/wet sock, glove, or pajamas (depending on area treating) followed by a dry sock, glove, or pajamas over the top. Do this nightly.     Side effects of topical steroids including but not limited to atrophy (skin thinning), striae (stretch marks) telangiectasias, steroid acne, and others. Do not apply to normal skin. Do not apply to discolored skin that does not have rash present.   Reviewed patient chart from [facility]  Proper skin care from [facility]:     -Eliminate harsh soaps as they strip the natural oils from the skin, often resulting in dry itchy skin ( i.e. Dial, Zest, [place] Spring)  -Use mild soaps such as Cetaphil or Dove Sensitive Skin in the shower. You do not need to use soap on arms, legs, and trunk every time you shower unless visibly soiled.   -Avoid hot or cold showers.  -After showering, lightly dry off and apply moisturizing within 2-3 minutes. This will help trap moisture in the skin.   -Aggressive use of a moisturizer at least 1-2 times a day to the entire body (including -Vanicream, Cetaphil, Aquaphor or Cerave) and moisturize hands after every washing.  -We recommend using moisturizers that come in a tub that needs to be scooped out, not a pump. This has more of an oil base. It will hold moisture in your skin much better than a water base moisturizer. The above recommended are non-pore clogging.
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
## 2022-05-17 ENCOUNTER — TRANSFERRED RECORDS (OUTPATIENT)
Dept: HEALTH INFORMATION MANAGEMENT | Facility: CLINIC | Age: 57
End: 2022-05-17
Payer: COMMERCIAL

## 2022-06-01 NOTE — PATIENT INSTRUCTIONS
Your BMI is Body mass index is 38.62 kg/m .  Weight management is a personal decision.  If you are interested in exploring weight loss strategies, the following discussion covers the approaches that may be successful. Body mass index (BMI) is one way to tell whether you are at a healthy weight, overweight, or obese. It measures your weight in relation to your height.  A BMI of 18.5 to 24.9 is in the healthy range. A person with a BMI of 25 to 29.9 is considered overweight, and someone with a BMI of 30 or greater is considered obese. More than two-thirds of American adults are considered overweight or obese.  Being overweight or obese increases the risk for further weight gain. Excess weight may lead to heart disease and diabetes.  Creating and following plans for healthy eating and physical activity may help you improve your health.  Weight control is part of healthy lifestyle and includes exercise, emotional health, and healthy eating habits. Careful eating habits lifelong are the mainstay of weight control. Though there are significant health benefits from weight loss, long-term weight loss with diet alone may be very difficult to achieve- studies show long-term success with dietary management in less than 10% of people. Attaining a healthy weight may be especially difficult to achieve in those with severe obesity. In some cases, medications, devices and surgical management might be considered.  What can you do?  If you are overweight or obese and are interested in methods for weight loss, you should discuss this with your provider.     Consider reducing daily calorie intake by 500 calories.     Keep a food journal.     Avoiding skipping meals, consider cutting portions instead.    Diet combined with exercise helps maintain muscle while optimizing fat loss. Strength training is particularly important for building and maintaining muscle mass. Exercise helps reduce stress, increase energy, and improves fitness.  Pt wife has covid and pt was going to cancel  Pt was offered a Virtual   Clerical team will call pt back as soon as it is confirmed   Please be advised thank you    Pt can be reached @   [phone] Increasing exercise without diet control, however, may not burn enough calories to loose weight.       Start walking three days a week 10-20 minutes at a time    Work towards walking thirty minutes five days a week     Eventually, increase the speed of your walking for 1-2 minutes at time    In addition, we recommend that you review healthy lifestyles and methods for weight loss available through the National Institutes of Health patient information sites:  http://win.niddk.nih.gov/publications/index.htm    And look into health and wellness programs that may be available through your health insurance provider, employer, local community center, or jannie club.    Weight management plan: Patient was referred to their PCP to discuss a diet and exercise plan.

## 2022-06-05 ENCOUNTER — HEALTH MAINTENANCE LETTER (OUTPATIENT)
Age: 57
End: 2022-06-05

## 2022-06-06 DIAGNOSIS — E78.2 MIXED HYPERLIPIDEMIA: ICD-10-CM

## 2022-06-06 DIAGNOSIS — N95.1 MENOPAUSAL SYNDROME (HOT FLASHES): ICD-10-CM

## 2022-06-06 DIAGNOSIS — I10 ESSENTIAL HYPERTENSION: ICD-10-CM

## 2022-06-06 NOTE — TELEPHONE ENCOUNTER
Routing refill request to provider for review/approval because:  Drug not on the [facility] refill protocol     Sadie Guillen RN on 6/6/2022 at 3:34 PM

## 2022-06-07 ENCOUNTER — MYC MEDICAL ADVICE (OUTPATIENT)
Dept: FAMILY MEDICINE | Facility: CLINIC | Age: 57
End: 2022-06-07
Payer: COMMERCIAL

## 2022-06-07 ENCOUNTER — TRANSFERRED RECORDS (OUTPATIENT)
Dept: HEALTH INFORMATION MANAGEMENT | Facility: CLINIC | Age: 57
End: 2022-06-07
Payer: COMMERCIAL

## 2022-06-07 RX ORDER — IRBESARTAN 150 MG/1
TABLET ORAL
Qty: 90 TABLET | Refills: 0 | Status: SHIPPED | OUTPATIENT
Start: 2022-06-07 | End: 2022-07-15

## 2022-06-07 RX ORDER — CHLORTHALIDONE 25 MG/1
TABLET ORAL
Qty: 90 TABLET | Refills: 0 | Status: SHIPPED | OUTPATIENT
Start: 2022-06-07 | End: 2022-07-15

## 2022-06-07 RX ORDER — ATORVASTATIN CALCIUM 40 MG/1
TABLET, FILM COATED ORAL
Qty: 90 TABLET | Refills: 0 | Status: SHIPPED | OUTPATIENT
Start: 2022-06-07 | End: 2022-07-15

## 2022-06-07 RX ORDER — GABAPENTIN 600 MG/1
TABLET ORAL
Qty: 90 TABLET | Refills: 0 | Status: SHIPPED | OUTPATIENT
Start: 2022-06-07 | End: 2022-07-15

## 2022-06-07 NOTE — TELEPHONE ENCOUNTER
Brief chart review.    Last comprehensive office visit with labs on 6/22/21. CMP WNL at that time. Will refill medications for 90 days.    Team Coordinators,    Can you please let patient know that we've refilled her medications but that she is due for an office visit and labs for ongoing medication refills?    Thanks,    Johnnie Gupta MD  [facility]

## 2022-06-15 NOTE — TELEPHONE ENCOUNTER
Patient is scheduled for AWV in July with Rylee YO  [facility]   [facility]

## 2022-07-15 ENCOUNTER — OFFICE VISIT (OUTPATIENT)
Dept: FAMILY MEDICINE | Facility: CLINIC | Age: 57
End: 2022-07-15
Payer: COMMERCIAL

## 2022-07-15 VITALS
BODY MASS INDEX: 35.26 KG/M2 | OXYGEN SATURATION: 93 % | DIASTOLIC BLOOD PRESSURE: 72 MMHG | HEIGHT: 63 IN | RESPIRATION RATE: 20 BRPM | TEMPERATURE: 98.4 F | HEART RATE: 78 BPM | WEIGHT: 199 LBS | SYSTOLIC BLOOD PRESSURE: 108 MMHG

## 2022-07-15 DIAGNOSIS — M25.552 HIP PAIN, LEFT: ICD-10-CM

## 2022-07-15 DIAGNOSIS — D58.2 ELEVATED HEMOGLOBIN (H): ICD-10-CM

## 2022-07-15 DIAGNOSIS — Z87.891 PERSONAL HISTORY OF NICOTINE DEPENDENCE: ICD-10-CM

## 2022-07-15 DIAGNOSIS — E83.110 HEREDITARY HEMOCHROMATOSIS (H): ICD-10-CM

## 2022-07-15 DIAGNOSIS — N95.1 MENOPAUSAL SYNDROME (HOT FLASHES): ICD-10-CM

## 2022-07-15 DIAGNOSIS — Z00.00 ROUTINE GENERAL MEDICAL EXAMINATION AT A HEALTH CARE FACILITY: Primary | ICD-10-CM

## 2022-07-15 DIAGNOSIS — K76.0 HEPATIC STEATOSIS: ICD-10-CM

## 2022-07-15 DIAGNOSIS — E78.2 MIXED HYPERLIPIDEMIA: ICD-10-CM

## 2022-07-15 DIAGNOSIS — Z72.0 TOBACCO USE: ICD-10-CM

## 2022-07-15 DIAGNOSIS — E66.01 MORBID OBESITY (H): ICD-10-CM

## 2022-07-15 DIAGNOSIS — Z96.642 STATUS POST TOTAL REPLACEMENT OF LEFT HIP: ICD-10-CM

## 2022-07-15 DIAGNOSIS — R32 URINARY INCONTINENCE, UNSPECIFIED TYPE: ICD-10-CM

## 2022-07-15 DIAGNOSIS — E66.2 OBESITY HYPOVENTILATION SYNDROME (H): ICD-10-CM

## 2022-07-15 DIAGNOSIS — I10 ESSENTIAL HYPERTENSION: ICD-10-CM

## 2022-07-15 LAB
ERYTHROCYTE [DISTWIDTH] IN BLOOD BY AUTOMATED COUNT: 13 % (ref 10–15)
HCT VFR BLD AUTO: 53.5 % (ref 35–47)
HGB BLD-MCNC: 18 G/DL (ref 11.7–15.7)
MCH RBC QN AUTO: 31.9 PG (ref 26.5–33)
MCHC RBC AUTO-ENTMCNC: 33.6 G/DL (ref 31.5–36.5)
MCV RBC AUTO: 95 FL (ref 78–100)
PLATELET # BLD AUTO: 206 10E3/UL (ref 150–450)
RBC # BLD AUTO: 5.64 10E6/UL (ref 3.8–5.2)
WBC # BLD AUTO: 6.5 10E3/UL (ref 4–11)

## 2022-07-15 PROCEDURE — 36415 COLL VENOUS BLD VENIPUNCTURE: CPT | Performed by: PHYSICIAN ASSISTANT

## 2022-07-15 PROCEDURE — 80061 LIPID PANEL: CPT | Performed by: PHYSICIAN ASSISTANT

## 2022-07-15 PROCEDURE — 80053 COMPREHEN METABOLIC PANEL: CPT | Performed by: PHYSICIAN ASSISTANT

## 2022-07-15 PROCEDURE — 82043 UR ALBUMIN QUANTITATIVE: CPT | Performed by: PHYSICIAN ASSISTANT

## 2022-07-15 PROCEDURE — 85027 COMPLETE CBC AUTOMATED: CPT | Performed by: PHYSICIAN ASSISTANT

## 2022-07-15 PROCEDURE — 99396 PREV VISIT EST AGE 40-64: CPT | Performed by: PHYSICIAN ASSISTANT

## 2022-07-15 PROCEDURE — 99214 OFFICE O/P EST MOD 30 MIN: CPT | Mod: 25 | Performed by: PHYSICIAN ASSISTANT

## 2022-07-15 RX ORDER — VARENICLINE TARTRATE 1 MG/1
1 TABLET, FILM COATED ORAL 2 TIMES DAILY
Qty: 180 TABLET | Refills: 0 | Status: SHIPPED | OUTPATIENT
Start: 2022-07-15

## 2022-07-15 RX ORDER — ATORVASTATIN CALCIUM 40 MG/1
40 TABLET, FILM COATED ORAL DAILY
Qty: 90 TABLET | Refills: 3 | Status: SHIPPED | OUTPATIENT
Start: 2022-07-15

## 2022-07-15 RX ORDER — GABAPENTIN 600 MG/1
TABLET ORAL
Qty: 90 TABLET | Refills: 1 | Status: SHIPPED | OUTPATIENT
Start: 2022-07-15 | End: 2023-03-03

## 2022-07-15 RX ORDER — CHLORTHALIDONE 25 MG/1
25 TABLET ORAL DAILY
Qty: 90 TABLET | Refills: 3 | Status: SHIPPED | OUTPATIENT
Start: 2022-07-15

## 2022-07-15 RX ORDER — ALBUTEROL SULFATE 90 UG/1
2 AEROSOL, METERED RESPIRATORY (INHALATION) EVERY 4 HOURS PRN
Qty: 18 G | Refills: 1 | Status: SHIPPED | OUTPATIENT
Start: 2022-07-15

## 2022-07-15 RX ORDER — HYDROCORTISONE 25 MG/G
OINTMENT TOPICAL 2 TIMES DAILY
Qty: 30 G | Refills: 0 | Status: SHIPPED | OUTPATIENT
Start: 2022-07-15

## 2022-07-15 RX ORDER — IRBESARTAN 150 MG/1
TABLET ORAL
Qty: 90 TABLET | Refills: 3 | Status: SHIPPED | OUTPATIENT
Start: 2022-07-15

## 2022-07-15 ASSESSMENT — ENCOUNTER SYMPTOMS
SORE THROAT: 1
MYALGIAS: 1
BREAST MASS: 1
FREQUENCY: 1
ARTHRALGIAS: 1
HEADACHES: 1
ABDOMINAL PAIN: 1
CONSTIPATION: 1

## 2022-07-15 ASSESSMENT — ACTIVITIES OF DAILY LIVING (ADL): CURRENT_FUNCTION: HOUSEWORK REQUIRES ASSISTANCE

## 2022-07-15 ASSESSMENT — PAIN SCALES - GENERAL: PAINLEVEL: NO PAIN (0)

## 2022-07-15 NOTE — PATIENT INSTRUCTIONS
"Varenicline Tartrate (Chantix):  Chantix tablets Days Dosage  0.5 mg once daily for the first three days.  0.5 mg  twice daily for the next four days.  1.0 mg twice daily from day 8 and forward.    How to take:  You will start taking Chantix one week before your quit date while you are still smoking. At first you will use the Chantix starter pack. Once you are finished with that, you will be on a \"maintenance dose\" that will probably stay the same for the rest of your treatment.  After a week of slowly increasing your dose, you will take Chantix twice a day. Take each dose after eating and with a full glass of water. Taking Chantix with food and water decreases nausea. That is also why you begin on a lower dose and slowly increase it. Once you begin the twice a day dosage, your 2 doses should be at least 8-10 hours apart and at least 4-5 hours before bedtime.     Duration of therapy: 12 weeks. An additional course of 12 weeks of treatment is recommended if the first 12 weeks were successful to improve long term abstinence.    Adverse reactions: The most common adverse reaction associated with varenicline treatment is nausea. Other common adverse effects include sleep disturbance, constipation, flatulence and vomiting.    There have been warnings from the FDA to be on the lookout for erratic behavior, suicidal ideation, or suicidal behavior. There is one case report of a death, though it appears alcohol consumption may have played a part in that case. Please report any behavior or mood changes as well as being aware of drowsiness. Be cautious when operating motor vehicles or dangerous machinery until the medication's effect on you is clear.        Lung Cancer screening -- You can call [phone] to schedule your imaging at [facility].       "

## 2022-07-15 NOTE — PROGRESS NOTES
"   SUBJECTIVE:   CC: Phuong New is an 57 year old woman who presents for preventive health visit.       Patient has been advised of split billing requirements and indicates understanding: Yes  Healthy Habits:     In general, how would you rate your overall health?  Fair    Frequency of exercise:  None    Do you usually eat at least 4 servings of fruit and vegetables a day, include whole grains    & fiber and avoid regularly eating high fat or \"junk\" foods?  No    Taking medications regularly:  Yes    Medication side effects:  None    Ability to successfully perform activities of daily living:  Housework requires assistance    Home Safety:  No safety concerns identified    Hearing Impairment:  Difficulty following a conversation in a noisy restaurant or crowded room, need to ask people to speak up or repeat themselves and difficulty understanding speech on the telephone    In the past 6 months, have you been bothered by leaking of urine? Yes    In general, how would you rate your overall mental or emotional health?  Fair      PHQ-2 Total Score: 2    Additional concerns today:  Yes    Phuong New is a 57 year old female who presents today for annual check up  Continues with dermatology - strong steroids are not improving the hand   Smoking around 1ppd still; really motivated to quit but not finding help with spouse  Has not gotten CPAP yet; knows we strongly recommend      Today's PHQ-2 Score:   PHQ-2 ( 1999 Pfizer) 7/15/2022   Q1: Little interest or pleasure in doing things 1   Q2: Feeling down, depressed or hopeless 1   PHQ-2 Score 2   PHQ-2 Total Score (12-17 Years)- Positive if 3 or more points; Administer PHQ-A if positive -   Q1: Little interest or pleasure in doing things Several days   Q2: Feeling down, depressed or hopeless Several days   PHQ-2 Score 2       Abuse: Current or Past (Physical, Sexual or Emotional) - No  Do you feel safe in your environment? Yes        Social History     Tobacco Use     " Smoking status: Current Every Day Smoker     Packs/day: 1.00     Years: 40.00     Pack years: 40.00     Types: Cigarettes     Start date: 6/22/1980     Smokeless tobacco: Never Used     Tobacco comment: gave pt info to take home about quitting   Substance Use Topics     Alcohol use: Yes     Comment: daily drinking 2-3      If you drink alcohol do you typically have >3 drinks per day or >7 drinks per week? Yes      Alcohol Use 7/15/2022   Prescreen: >3 drinks/day or >7 drinks/week? Yes   Prescreen: >3 drinks/day or >7 drinks/week? -   AUDIT SCORE  10       Reviewed orders with patient.  Reviewed health maintenance and updated orders accordingly - Yes  Lab work is in process  Labs reviewed in EPIC    Breast Cancer Screening:    FHS-7:   Breast CA Risk Assessment (FHS-7) 3/11/2022   Did any of your first-degree relatives have breast or ovarian cancer? Yes   Did any of your relatives have bilateral breast cancer? No   Did any man in your family have breast cancer? No   Did any woman in your family have breast and ovarian cancer? No   Did any woman in your family have breast cancer before age 50 y? Yes   Do you have 2 or more relatives with breast and/or ovarian cancer? No   Do you have 2 or more relatives with breast and/or bowel cancer? No       Mammogram Screening: Recommended mammography every 1-2 years with patient discussion and risk factor consideration  Pertinent mammograms are reviewed under the imaging tab.     History of abnormal Pap smear: hysterectomy     Reviewed and updated as needed this visit by clinical staff   Tobacco  Allergies  Meds   Med Hx  Surg Hx  Fam Hx  Soc Hx          Reviewed and updated as needed this visit by Provider                     Review of Systems   HENT: Positive for congestion, ear pain and sore throat.    Eyes: Positive for visual disturbance.   Cardiovascular: Positive for chest pain.   Gastrointestinal: Positive for abdominal pain and constipation.   Breasts:  Positive  "for tenderness and breast mass. Negative for discharge.   Genitourinary: Positive for frequency, pelvic pain and urgency. Negative for vaginal bleeding and vaginal discharge.   Musculoskeletal: Positive for arthralgias and myalgias.   Neurological: Positive for headaches.      OBJECTIVE:   /72 (BP Location: Right arm, Patient Position: Sitting, Cuff Size: Adult Regular)   Pulse 78   Temp 98.4  F (36.9  C) (Oral)   Resp 20   Ht 1.6 m (5' 3\")   Wt 90.3 kg (199 lb)   LMP  (LMP Unknown)   SpO2 93%   BMI 35.25 kg/m    Physical Exam  GENERAL: healthy, alert and no distress  EYES: Eyes grossly normal to inspection, PERRL and conjunctivae and sclerae normal  HENT: ear canals and TM's normal, nose and mouth without ulcers or lesions  NECK: no adenopathy, no asymmetry, masses, or scars and thyroid normal to palpation  RESP: decreased breath sounds throughout; good effort  CV: regular rates and rhythm  ABDOMEN: soft, nontender, no hepatosplenomegaly, no masses and bowel sounds normal  MS: No peripheral edema   SKIN: tender mobile mass along the anterior chest wall just right of midline  PSYCH: mentation appears normal, affect normal/bright    Diagnostic Test Results:  Labs reviewed in Epic  Updating today    ASSESSMENT/PLAN:   1. Routine general medical examination at a health care facility  Reviewed personal and family history. Reviewed age appropriate screenings. Recommended any needed vaccinations.  - hydrocortisone 2.5 % ointment; Apply topically 2 times daily  Dispense: 30 g; Refill: 0    2. Mixed hyperlipidemia  Updating labs. Continue lifestyle modification  - atorvastatin (LIPITOR) 40 MG tablet; Take 1 tablet (40 mg) by mouth daily  Dispense: 90 tablet; Refill: 3  - Lipid panel reflex to direct LDL Fasting; Future  - Comprehensive metabolic panel (BMP + Alb, Alk Phos, ALT, AST, Total. Bili, TP); Future  - Lipid panel reflex to direct LDL Fasting  - Comprehensive metabolic panel (BMP + Alb, Alk Phos, ALT, " AST, Total. Bili, TP)    3. Essential hypertension  Controlled. Continue present management.   - chlorthalidone (HYGROTON) 25 MG tablet; Take 1 tablet (25 mg) by mouth daily  Dispense: 90 tablet; Refill: 3  - irbesartan (AVAPRO) 150 MG tablet; TAKE 1 TABLET(150 MG) BY MOUTH AT BEDTIME  Dispense: 90 tablet; Refill: 3  - Comprehensive metabolic panel (BMP + Alb, Alk Phos, ALT, AST, Total. Bili, TP); Future  - Albumin Random Urine Quantitative with Creat Ratio; Future  - Comprehensive metabolic panel (BMP + Alb, Alk Phos, ALT, AST, Total. Bili, TP)  - Albumin Random Urine Quantitative with Creat Ratio    4. Menopausal syndrome (hot flashes)  She still feels like she benefits from this medication. Ok to continue  - gabapentin (NEURONTIN) 600 MG tablet; TAKE 1 TABLET(600 MG) BY MOUTH DAILY  Dispense: 90 tablet; Refill: 1    5. Elevated hemoglobin (H)  Followed with Dr. Mcdaniels and had extensive work up, all negative. Likely ALONSO and smoking. She has not yet pursued CPAP  - CBC with platelets; Future  - CBC with platelets    6. Morbid obesity (H)  Continue to focus on lifestyle reduction to help with copd, htn, etc    7. Hereditary hemochromatosis (H)  See above. Stable   - CBC with platelets; Future  - CBC with platelets    8. Obesity hypoventilation syndrome (H)  Strongly recommend CPAP  - albuterol (PROAIR HFA) 108 (90 Base) MCG/ACT inhaler; Inhale 2 puffs into the lungs every 4 hours as needed for shortness of breath / dyspnea  Dispense: 18 g; Refill: 1    9. Tobacco use  She is still motivated to quit. We'll repeat lung cancer screen. Start chantix  - albuterol (PROAIR HFA) 108 (90 Base) MCG/ACT inhaler; Inhale 2 puffs into the lungs every 4 hours as needed for shortness of breath / dyspnea  Dispense: 18 g; Refill: 1  - varenicline (CHANTIX KOBE) 0.5 MG X 11 & 1 MG X 42 tablet; Take 0.5 mg tab daily for 3 days, THEN 0.5 mg tab twice daily for 4 days, THEN 1 mg twice daily.  Dispense: 53 tablet; Refill: 0  - CT Chest Lung  "Cancer Scrn Low Dose wo; Future  - varenicline (CHANTIX) 1 MG tablet; Take 1 tablet (1 mg) by mouth 2 times daily  Dispense: 180 tablet; Refill: 0    10. Personal history of nicotine dependence   - CT Chest Lung Cancer Scrn Low Dose wo; Future      12. Hepatic steatosis  FIB4 score <1. Continue to work on weight and etoh reduction    13. Urinary incontinence, unspecified type  Recommend she get help with kegels  - Physical Therapy Referral; Future    14. Status post total replacement of left hip  15. Hip pain, left  Not examined today. She mentioned she'd like to see someone for this on my way out of the room. She had hip repair in 2016 but has been having increased pain. Referralplaced  - Orthopedic  Referral; Future      COUNSELING:  Reviewed preventive health counseling, as reflected in patient instructions    Estimated body mass index is 35.25 kg/m  as calculated from the following:    Height as of this encounter: 1.6 m (5' 3\").    Weight as of this encounter: 90.3 kg (199 lb).        She reports that she has been smoking cigarettes. She started smoking about 42 years ago. She has a 40.00 pack-year smoking history. She has never used smokeless tobacco.  Tobacco Cessation Action Plan:   Pharmacotherapies : varenicline (Chantix)      Counseling Resources:  ATP IV Guidelines  Pooled Cohorts Equation Calculator  Breast Cancer Risk Calculator  BRCA-Related Cancer Risk Assessment: FHS-7 Tool  FRAX Risk Assessment  ICSI Preventive Guidelines  Dietary Guidelines for Americans, 2010  USDA's MyPlate  ASA Prophylaxis  Lung CA Screening    JAQUAN Giron [facility]  "

## 2022-07-16 LAB
ALBUMIN SERPL-MCNC: 4 G/DL (ref 3.4–5)
ALP SERPL-CCNC: 85 U/L (ref 40–150)
ALT SERPL W P-5'-P-CCNC: 31 U/L (ref 0–50)
ANION GAP SERPL CALCULATED.3IONS-SCNC: 4 MMOL/L (ref 3–14)
AST SERPL W P-5'-P-CCNC: 19 U/L (ref 0–45)
BILIRUB SERPL-MCNC: 0.6 MG/DL (ref 0.2–1.3)
BUN SERPL-MCNC: 10 MG/DL (ref 7–30)
CALCIUM SERPL-MCNC: 9.6 MG/DL (ref 8.5–10.1)
CHLORIDE BLD-SCNC: 102 MMOL/L (ref 94–109)
CHOLEST SERPL-MCNC: 183 MG/DL
CO2 SERPL-SCNC: 31 MMOL/L (ref 20–32)
CREAT SERPL-MCNC: 0.66 MG/DL (ref 0.52–1.04)
CREAT UR-MCNC: 213 MG/DL
FASTING STATUS PATIENT QL REPORTED: YES
GFR SERPL CREATININE-BSD FRML MDRD: >90 ML/MIN/1.73M2
GLUCOSE BLD-MCNC: 88 MG/DL (ref 70–99)
HDLC SERPL-MCNC: 53 MG/DL
LDLC SERPL CALC-MCNC: 90 MG/DL
MICROALBUMIN UR-MCNC: 41 MG/L
MICROALBUMIN/CREAT UR: 19.25 MG/G CR (ref 0–25)
NONHDLC SERPL-MCNC: 130 MG/DL
POTASSIUM BLD-SCNC: 4.2 MMOL/L (ref 3.4–5.3)
PROT SERPL-MCNC: 7.6 G/DL (ref 6.8–8.8)
SODIUM SERPL-SCNC: 137 MMOL/L (ref 133–144)
TRIGL SERPL-MCNC: 200 MG/DL

## 2022-10-15 ENCOUNTER — HEALTH MAINTENANCE LETTER (OUTPATIENT)
Age: 57
End: 2022-10-15

## 2022-11-14 ENCOUNTER — E-VISIT (OUTPATIENT)
Dept: FAMILY MEDICINE | Facility: CLINIC | Age: 57
End: 2022-11-14
Payer: COMMERCIAL

## 2022-11-14 ENCOUNTER — NURSE TRIAGE (OUTPATIENT)
Dept: FAMILY MEDICINE | Facility: CLINIC | Age: 57
End: 2022-11-14

## 2022-11-14 DIAGNOSIS — R39.9 URINARY SYMPTOM OR SIGN: Primary | ICD-10-CM

## 2022-11-14 DIAGNOSIS — N30.01 ACUTE CYSTITIS WITH HEMATURIA: ICD-10-CM

## 2022-11-14 PROCEDURE — 99421 OL DIG E/M SVC 5-10 MIN: CPT | Performed by: PHYSICIAN ASSISTANT

## 2022-11-14 NOTE — TELEPHONE ENCOUNTER
Please contact patient and help get her schedule for lab visit - this can be double booked as it is for an evisit

## 2022-11-14 NOTE — TELEPHONE ENCOUNTER
"Received call from pt   She has had UTI symptoms since last Wednesday  She has been taking AZO, but stopped yesterday  Advised E-visit or telephone visit  VV for 11/15/22  Advised pt to cancel this visit if she is able to get on matthew internet to send an E-visit thru [facility]    Pt verbalized understanding and agrees to the plan    Erika Deleon RN on 11/14/2022 at 11:57 AM      Answer Assessment - Initial Assessment Questions  1. SYMPTOM: \"What's the main symptom you're concerned about?\" (e.g., frequency, incontinence)      Pain burning with urination  2. ONSET: \"When did the  pain  start?\"      Last week  3. PAIN: \"Is there any pain?\" If Yes, ask: \"How bad is it?\" (Scale: 1-10; mild, moderate, severe)      Urethra, and low back  4. CAUSE: \"What do you think is causing the symptoms?\"      Probable UTI  5. OTHER SYMPTOMS: \"Do you have any other symptoms?\" (e.g., fever, flank pain, blood in urine, pain with urination)      Pain with urination. Frequency and urgency  6. PREGNANCY: \"Is there any chance you are pregnant?\" \"When was your last menstrual period?\"      NA    Protocols used: URINARY SYMPTOMS-A-[place]      "

## 2022-11-15 ENCOUNTER — LAB (OUTPATIENT)
Dept: LAB | Facility: CLINIC | Age: 57
End: 2022-11-15
Payer: COMMERCIAL

## 2022-11-15 DIAGNOSIS — R39.9 URINARY SYMPTOM OR SIGN: ICD-10-CM

## 2022-11-15 LAB
ALBUMIN UR-MCNC: 100 MG/DL
APPEARANCE UR: CLEAR
BACTERIA #/AREA URNS HPF: ABNORMAL /HPF
BILIRUB UR QL STRIP: NEGATIVE
COLOR UR AUTO: YELLOW
GLUCOSE UR STRIP-MCNC: NEGATIVE MG/DL
HGB UR QL STRIP: ABNORMAL
KETONES UR STRIP-MCNC: NEGATIVE MG/DL
LEUKOCYTE ESTERASE UR QL STRIP: ABNORMAL
MUCOUS THREADS #/AREA URNS LPF: PRESENT /LPF
NITRATE UR QL: POSITIVE
PH UR STRIP: 5.5 [PH] (ref 5–7)
RBC #/AREA URNS AUTO: ABNORMAL /HPF
SP GR UR STRIP: 1.01 (ref 1–1.03)
SQUAMOUS #/AREA URNS AUTO: ABNORMAL /LPF
UROBILINOGEN UR STRIP-ACNC: 0.2 E.U./DL
WBC #/AREA URNS AUTO: >100 /HPF
WBC CLUMPS #/AREA URNS HPF: PRESENT /HPF

## 2022-11-15 PROCEDURE — 87186 SC STD MICRODIL/AGAR DIL: CPT

## 2022-11-15 PROCEDURE — 87086 URINE CULTURE/COLONY COUNT: CPT

## 2022-11-15 PROCEDURE — 81001 URINALYSIS AUTO W/SCOPE: CPT

## 2022-11-15 RX ORDER — SULFAMETHOXAZOLE/TRIMETHOPRIM 800-160 MG
1 TABLET ORAL 2 TIMES DAILY
Qty: 6 TABLET | Refills: 0 | Status: SHIPPED | OUTPATIENT
Start: 2022-11-15

## 2022-11-16 ENCOUNTER — HOSPITAL ENCOUNTER (OUTPATIENT)
Dept: CT IMAGING | Facility: CLINIC | Age: 57
Discharge: HOME | End: 2022-11-16
Attending: PHYSICIAN ASSISTANT | Admitting: PHYSICIAN ASSISTANT
Payer: COMMERCIAL

## 2022-11-16 DIAGNOSIS — Z87.891 PERSONAL HISTORY OF NICOTINE DEPENDENCE: ICD-10-CM

## 2022-11-16 DIAGNOSIS — Z72.0 TOBACCO USE: ICD-10-CM

## 2022-11-16 LAB — BACTERIA UR CULT: ABNORMAL

## 2022-11-16 PROCEDURE — 71271 CT THORAX LUNG CANCER SCR C-: CPT

## 2022-11-17 ENCOUNTER — TELEPHONE (OUTPATIENT)
Dept: FAMILY MEDICINE | Facility: CLINIC | Age: 57
End: 2022-11-17

## 2022-11-17 NOTE — TELEPHONE ENCOUNTER
[facility], calling to give FYI to PCP, please review 2. Significant Incidental Finding(s):  Category S: Yes. Moderate coronary artery calcifications    They have to confirm that PCP reviewed this information, FYI only, message routed, may close if PCP aware, pt not on cholesterol medications    Renate Austin, RN, BSN  [facility]

## 2022-11-17 NOTE — TELEPHONE ENCOUNTER
Thanks, this is not new to patient; however the  information below is incorrect - patient is on atorvastatin and additionally a low dose aspirin. Can you confirm if it was the patient who stated they are not on cholesterol medication?

## 2022-11-21 NOTE — TELEPHONE ENCOUNTER
CLIVE, sorry, my mistake, I can't recall if they said anything, no action needed, was CLIVE only  Renate Austin RN, BSN  [facility]

## 2023-03-03 DIAGNOSIS — N95.1 MENOPAUSAL SYNDROME (HOT FLASHES): ICD-10-CM

## 2023-03-03 RX ORDER — GABAPENTIN 600 MG/1
TABLET ORAL
Qty: 90 TABLET | Refills: 1 | Status: SHIPPED | OUTPATIENT
Start: 2023-03-03

## 2023-07-10 DIAGNOSIS — I10 ESSENTIAL HYPERTENSION: ICD-10-CM

## 2023-07-10 DIAGNOSIS — E78.2 MIXED HYPERLIPIDEMIA: ICD-10-CM

## 2023-07-10 NOTE — TELEPHONE ENCOUNTER
Pharmacy: Patients insurance suggests a 100 day fill for optimum patient adherence. Please send new order for #100 at a time if appropriate.

## 2023-07-11 RX ORDER — IRBESARTAN 150 MG/1
TABLET ORAL
Qty: 90 TABLET | Refills: 3 | OUTPATIENT
Start: 2023-07-11

## 2023-07-11 RX ORDER — ATORVASTATIN CALCIUM 40 MG/1
40 TABLET, FILM COATED ORAL DAILY
Qty: 90 TABLET | Refills: 3 | OUTPATIENT
Start: 2023-07-11

## 2023-07-16 DIAGNOSIS — I10 ESSENTIAL HYPERTENSION: ICD-10-CM

## 2023-07-18 RX ORDER — IRBESARTAN 150 MG/1
TABLET ORAL
Qty: 90 TABLET | Refills: 0 | OUTPATIENT
Start: 2023-07-18

## 2023-08-20 ENCOUNTER — HEALTH MAINTENANCE LETTER (OUTPATIENT)
Age: 58
End: 2023-08-20

## 2024-05-26 ENCOUNTER — HEALTH MAINTENANCE LETTER (OUTPATIENT)
Age: 59
End: 2024-05-26

## 2025-07-11 ENCOUNTER — HOSPITAL ENCOUNTER (EMERGENCY)
Facility: CLINIC | Age: 60
Discharge: HOME | End: 2025-07-11
Attending: EMERGENCY MEDICINE | Admitting: EMERGENCY MEDICINE
Payer: COMMERCIAL

## 2025-07-11 VITALS
DIASTOLIC BLOOD PRESSURE: 71 MMHG | RESPIRATION RATE: 18 BRPM | HEIGHT: 63 IN | TEMPERATURE: 99.5 F | OXYGEN SATURATION: 98 % | BODY MASS INDEX: 37.81 KG/M2 | SYSTOLIC BLOOD PRESSURE: 135 MMHG | WEIGHT: 213.41 LBS | HEART RATE: 100 BPM

## 2025-07-11 DIAGNOSIS — L02.213 CHEST WALL ABSCESS: ICD-10-CM

## 2025-07-11 LAB
ANION GAP SERPL CALCULATED.3IONS-SCNC: 14 MMOL/L (ref 7–15)
BASOPHILS # BLD AUTO: 0 10E3/UL (ref 0–0.2)
BASOPHILS NFR BLD AUTO: 0 %
BUN SERPL-MCNC: 13.3 MG/DL (ref 8–23)
CALCIUM SERPL-MCNC: 9.7 MG/DL (ref 8.8–10.4)
CHLORIDE SERPL-SCNC: 100 MMOL/L (ref 98–107)
CREAT SERPL-MCNC: 0.81 MG/DL (ref 0.51–0.95)
EGFRCR SERPLBLD CKD-EPI 2021: 82 ML/MIN/1.73M2
EOSINOPHIL # BLD AUTO: 0.2 10E3/UL (ref 0–0.7)
EOSINOPHIL NFR BLD AUTO: 2 %
ERYTHROCYTE [DISTWIDTH] IN BLOOD BY AUTOMATED COUNT: 12.3 % (ref 10–15)
GLUCOSE SERPL-MCNC: 112 MG/DL (ref 70–99)
HCO3 SERPL-SCNC: 28 MMOL/L (ref 22–29)
HCT VFR BLD AUTO: 44.9 % (ref 35–47)
HGB BLD-MCNC: 15.8 G/DL (ref 11.7–15.7)
HOLD SPECIMEN: NORMAL
HOLD SPECIMEN: NORMAL
IMM GRANULOCYTES # BLD: 0 10E3/UL
IMM GRANULOCYTES NFR BLD: 0 %
LACTATE SERPL-SCNC: 1 MMOL/L (ref 0.7–2)
LYMPHOCYTES # BLD AUTO: 1.5 10E3/UL (ref 0.8–5.3)
LYMPHOCYTES NFR BLD AUTO: 17 %
MCH RBC QN AUTO: 32.3 PG (ref 26.5–33)
MCHC RBC AUTO-ENTMCNC: 35.2 G/DL (ref 31.5–36.5)
MCV RBC AUTO: 92 FL (ref 78–100)
MONOCYTES # BLD AUTO: 0.8 10E3/UL (ref 0–1.3)
MONOCYTES NFR BLD AUTO: 9 %
NEUTROPHILS # BLD AUTO: 6.4 10E3/UL (ref 1.6–8.3)
NEUTROPHILS NFR BLD AUTO: 72 %
NRBC # BLD AUTO: 0 10E3/UL
NRBC BLD AUTO-RTO: 0 /100
PLATELET # BLD AUTO: 223 10E3/UL (ref 150–450)
POTASSIUM SERPL-SCNC: 4.1 MMOL/L (ref 3.4–5.3)
RBC # BLD AUTO: 4.89 10E6/UL (ref 3.8–5.2)
SODIUM SERPL-SCNC: 142 MMOL/L (ref 135–145)
WBC # BLD AUTO: 8.9 10E3/UL (ref 4–11)

## 2025-07-11 PROCEDURE — 36415 COLL VENOUS BLD VENIPUNCTURE: CPT | Performed by: EMERGENCY MEDICINE

## 2025-07-11 PROCEDURE — 99283 EMERGENCY DEPT VISIT LOW MDM: CPT | Mod: 25 | Performed by: EMERGENCY MEDICINE

## 2025-07-11 PROCEDURE — 10060 I&D ABSCESS SIMPLE/SINGLE: CPT | Performed by: EMERGENCY MEDICINE

## 2025-07-11 PROCEDURE — 87040 BLOOD CULTURE FOR BACTERIA: CPT | Performed by: EMERGENCY MEDICINE

## 2025-07-11 PROCEDURE — 80048 BASIC METABOLIC PNL TOTAL CA: CPT | Performed by: EMERGENCY MEDICINE

## 2025-07-11 PROCEDURE — 83605 ASSAY OF LACTIC ACID: CPT | Performed by: EMERGENCY MEDICINE

## 2025-07-11 PROCEDURE — 85025 COMPLETE CBC W/AUTO DIFF WBC: CPT | Performed by: EMERGENCY MEDICINE

## 2025-07-11 PROCEDURE — 85004 AUTOMATED DIFF WBC COUNT: CPT | Performed by: EMERGENCY MEDICINE

## 2025-07-11 RX ORDER — LIDOCAINE HYDROCHLORIDE AND EPINEPHRINE 10; 10 MG/ML; UG/ML
5 INJECTION, SOLUTION INFILTRATION; PERINEURAL ONCE
Status: DISCONTINUED | OUTPATIENT
Start: 2025-07-11 | End: 2025-07-11 | Stop reason: HOSPADM

## 2025-07-11 ASSESSMENT — COLUMBIA-SUICIDE SEVERITY RATING SCALE - C-SSRS
6. HAVE YOU EVER DONE ANYTHING, STARTED TO DO ANYTHING, OR PREPARED TO DO ANYTHING TO END YOUR LIFE?: NO
1. IN THE PAST MONTH, HAVE YOU WISHED YOU WERE DEAD OR WISHED YOU COULD GO TO SLEEP AND NOT WAKE UP?: NO
2. HAVE YOU ACTUALLY HAD ANY THOUGHTS OF KILLING YOURSELF IN THE PAST MONTH?: NO

## 2025-07-11 ASSESSMENT — ACTIVITIES OF DAILY LIVING (ADL)
ADLS_ACUITY_SCORE: 44
ADLS_ACUITY_SCORE: 44

## 2025-07-11 NOTE — ED TRIAGE NOTES
Has had a cyst to right breast for 15 years. Now a week ago, grew in size and ruptured Sunday afternoon. Has been cleaning it. Put on doxy and keflex since Monday. Ibuprofen taken last at 2130.
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
## 2025-07-16 LAB
BACTERIA SPEC CULT: NO GROWTH
BACTERIA SPEC CULT: NO GROWTH

## 2025-08-04 ENCOUNTER — PATIENT OUTREACH (OUTPATIENT)
Dept: CARE COORDINATION | Facility: CLINIC | Age: 60
End: 2025-08-04
Payer: COMMERCIAL

## (undated) DEVICE — PREP POVIDONE IODINE SOLUTION 10% 4OZ

## (undated) DEVICE — NDL BONE BIOPSY TRAPLOCK 8GAX4"  DBMNJ0804TL

## (undated) DEVICE — PEN MARKING SKIN

## (undated) DEVICE — DRSG GAUZE 4X4" TRAY

## (undated) DEVICE — SYR 20ML LL W/O NDL 302830

## (undated) DEVICE — NDL 18GA 1.5" 305196

## (undated) DEVICE — BLADE KNIFE SURG 11 371111

## (undated) DEVICE — NDL 22GA 1.5"

## (undated) DEVICE — GLOVE PROTEXIS POWDER FREE SMT 8.0  2D72PT80X

## (undated) DEVICE — NDL BX BONE MARROW 15GA 2" A-152

## (undated) DEVICE — SYR 10ML LL W/O NDL 302995

## (undated) DEVICE — NDL 21GA 1.5"

## (undated) RX ORDER — FENTANYL CITRATE 50 UG/ML
INJECTION, SOLUTION INTRAMUSCULAR; INTRAVENOUS
Status: DISPENSED
Start: 2019-11-08